# Patient Record
Sex: MALE | Race: WHITE | NOT HISPANIC OR LATINO | URBAN - METROPOLITAN AREA
[De-identification: names, ages, dates, MRNs, and addresses within clinical notes are randomized per-mention and may not be internally consistent; named-entity substitution may affect disease eponyms.]

---

## 2024-01-04 ENCOUNTER — TELEPHONE (OUTPATIENT)
Dept: NEUROSURGERY | Facility: CLINIC | Age: 54
End: 2024-01-04
Payer: OTHER GOVERNMENT

## 2024-01-04 NOTE — TELEPHONE ENCOUNTER
----- Message from Jenifer Stark sent at 1/4/2024  1:52 PM CST -----  Contact: PT  1/4/24    PT is requesting a callback in regards to scheduling, referral in WQ- pt advised that he has his images from an outsider provider on disc.      Pt phone    [phone] (home)       Thanks    Jenifer PINA

## 2024-02-06 ENCOUNTER — OFFICE VISIT (OUTPATIENT)
Dept: NEUROSURGERY | Facility: CLINIC | Age: 54
End: 2024-02-06
Payer: OTHER GOVERNMENT

## 2024-02-06 VITALS — SYSTOLIC BLOOD PRESSURE: 114 MMHG | WEIGHT: 297.63 LBS | DIASTOLIC BLOOD PRESSURE: 79 MMHG | TEMPERATURE: 98 F

## 2024-02-06 DIAGNOSIS — Z98.1 S/P SPINAL FUSION: ICD-10-CM

## 2024-02-06 DIAGNOSIS — R20.0 NUMBNESS OF LEGS: ICD-10-CM

## 2024-02-06 DIAGNOSIS — M54.16 LUMBAR RADICULOPATHY: ICD-10-CM

## 2024-02-06 DIAGNOSIS — M54.9 DORSALGIA, UNSPECIFIED: Primary | ICD-10-CM

## 2024-02-06 PROCEDURE — 99214 OFFICE O/P EST MOD 30 MIN: CPT | Mod: PBBFAC | Performed by: NURSE PRACTITIONER

## 2024-02-06 PROCEDURE — 99204 OFFICE O/P NEW MOD 45 MIN: CPT | Mod: S$PBB,,, | Performed by: NURSE PRACTITIONER

## 2024-02-06 PROCEDURE — 99999 PR PBB SHADOW E&M-EST. PATIENT-LVL IV: CPT | Mod: PBBFAC,,, | Performed by: NURSE PRACTITIONER

## 2024-02-06 RX ORDER — LEVOTHYROXINE SODIUM 100 UG/1
100 TABLET ORAL
COMMUNITY
Start: 2023-08-04

## 2024-02-06 RX ORDER — DICLOFENAC SODIUM 10 MG/G
GEL TOPICAL
COMMUNITY
Start: 2024-01-19

## 2024-02-06 RX ORDER — POLYETHYLENE GLYCOL 3350 17 G/17G
17 POWDER, FOR SOLUTION ORAL
COMMUNITY
Start: 2024-01-22 | End: 2024-03-27 | Stop reason: CLARIF

## 2024-02-06 RX ORDER — ATORVASTATIN CALCIUM 10 MG/1
TABLET, FILM COATED ORAL
COMMUNITY
Start: 2023-08-04 | End: 2024-08-04

## 2024-02-06 RX ORDER — CHOLECALCIFEROL (VITAMIN D3) 50 MCG
50 TABLET ORAL
COMMUNITY
Start: 2023-07-27 | End: 2024-03-27 | Stop reason: CLARIF

## 2024-02-06 RX ORDER — BUPRENORPHINE HYDROCHLORIDE 300 UG/1
300 FILM, SOLUBLE BUCCAL
COMMUNITY
Start: 2023-06-05 | End: 2024-03-27 | Stop reason: CLARIF

## 2024-02-06 RX ORDER — DIAZEPAM 5 MG/1
5 TABLET ORAL
COMMUNITY
Start: 2023-09-27 | End: 2024-03-27 | Stop reason: CLARIF

## 2024-02-06 RX ORDER — METHOCARBAMOL 500 MG/1
TABLET, FILM COATED ORAL
COMMUNITY
Start: 2024-01-20 | End: 2024-02-19

## 2024-02-06 RX ORDER — METHYLPHENIDATE HYDROCHLORIDE 20 MG/1
20 TABLET ORAL 2 TIMES DAILY
COMMUNITY

## 2024-02-06 RX ORDER — NALOXONE HYDROCHLORIDE 4 MG/.1ML
SPRAY NASAL
COMMUNITY
Start: 2023-10-19

## 2024-02-06 RX ORDER — BUPRENORPHINE 2 MG/1
TABLET SUBLINGUAL
COMMUNITY
Start: 2023-09-26 | End: 2024-02-21

## 2024-02-06 RX ORDER — CHOLECALCIFEROL (VITAMIN D3) 50 MCG
TABLET ORAL
COMMUNITY
Start: 2024-01-19 | End: 2024-07-27

## 2024-02-06 RX ORDER — MELOXICAM 7.5 MG/1
TABLET ORAL
COMMUNITY
Start: 2024-01-22 | End: 2024-04-02

## 2024-02-06 NOTE — PROGRESS NOTES
Neurosurgery  History & Physical    SUBJECTIVE:     History of Present Illness: Dexter Ward is a 53 y.o. male being seen in clinic today for a second opinion regarding worsening lumbar radiculopathy and broken hardware. The patient has a hx of L4-S1 PSF in 2010/2011. States that the surgery alleviated some of his back pain and allowed him to stand up straighter. In 2014 it was noted that the pedicle screw on the right at S1 was broken. Lucency also noted to the bilateral screws at L4/5. Around 2016 he sustained a fall that has since led to a slow progression of back and leg pain R>L despite conservative treatment. His pain management provider has informed him that a SCS trial might be his last option. Describes the pain as constant and aching across the low back with radiation into the right buttock wrapping into the hip and extending down the thigh. Denies left leg pain. Rates the pain as a 5/10. Aggravating factors include standing and walking. Alleviating factors include sitting. Denies weakness, b/b dysfunction, saddle anesthesia, or gait instability.      Review of patient's allergies indicates:   Allergen Reactions    Opioids - morphine analogues        Current Outpatient Medications   Medication Sig Dispense Refill    atorvastatin (LIPITOR) 10 MG tablet TAKE ONE-HALF TABLET BY MOUTH EVERY DAY FOR CHOLESTEROL      buprenorphine HCL (BELBUCA) 300 mcg Film 300 mcg.      buprenorphine HCL (SUBUTEX) 2 mg Subl DISSOLVE ONE TABLET UNDER THE TONGUE TWICE A DAY FOR CHRONIC PAIN      cholecalciferol, vitamin D3, (VITAMIN D3) 50 mcg (2,000 unit) Tab 50 mcg.      cholecalciferol, vitamin D3, (VITAMIN D3) 50 mcg (2,000 unit) Tab TAKE ONE TABLET BY MOUTH DAILY AS A VITAMIN D3 SUPPLEMENT      diazePAM (VALIUM) 5 MG tablet Take 5 mg by mouth.      diclofenac sodium (VOLTAREN) 1 % Gel Apply topically.      levothyroxine (SYNTHROID) 100 MCG tablet 100 mcg.      meloxicam (MOBIC) 7.5 MG tablet TAKE ONE TABLET BY MOUTH  DAILY FOR PAIN AND INFLAMMATION      methocarbamoL (ROBAXIN) 500 MG Tab TAKE TWO TABLETS BY MOUTH FOUR TIMES A DAY AS NEEDED      methyl salicylate-menthol 15-10% 15-10 % Crea APPLY MODERATE AMOUNT TOPICALLY THREE TIMES A DAY FOR PAIN RELIEF      naloxone (NARCAN) 4 mg/actuation Spry SMARTSIG:Both Nares      polyethylene glycol (GLYCOLAX) 17 gram/dose powder Take 17 g by mouth.      semaglutide (OZEMPIC) 2 mg/dose (8 mg/3 mL) PnIj INJECT 2MG SUBCUTANEOUSLY (UNDER THE SKIN) WEEKLY FOR DIABETES * NOTICE THESE ARE 2MG/0.75ML PENS BACK IN STOCK      methylphenidate HCl (RITALIN) 20 MG tablet Take 20 mg by mouth 2 (two) times daily.       No current facility-administered medications for this visit.       No past medical history on file.  No past surgical history on file.  Family History    None       Social History     Socioeconomic History    Marital status: Unknown   Tobacco Use    Smoking status: Never    Smokeless tobacco: Never       Review of Systems    OBJECTIVE:     Vital Signs  Temp: 98.1 °F (36.7 °C)  Pulse: (P) 74  BP: 114/79  Pain Score:   5  Weight: 135 kg (297 lb 9.9 oz)  There is no height or weight on file to calculate BMI.      Neurosurgery Physical Exam  General: well developed, well nourished, no distress.   Head: normocephalic, atraumatic  Neurologic: Alert and oriented. Thought content appropriate.  GCS: Motor: 6/Verbal: 5/Eyes: 4 GCS Total: 15  Mental Status: Awake, Alert, Oriented x 4  Language: No aphasia  Speech: No dysarthria  Cranial nerves: face symmetric, tongue midline, CN II-XII grossly intact.   Eyes: pupils equal, round, reactive to light with accomodation, EOMI.   Pulmonary: normal respirations, no signs of respiratory distress  Abdomen: soft, non-distended  Skin: Skin is warm, dry and intact.  Sensory: intact to light touch throughout  Motor Strength:Moves all extremities spontaneously with good tone.   Strength  Deltoids Triceps Biceps Wrist Extension Wrist Flexion Hand    Upper: R  5/5 5/5 5/5 5/5 5/5 5/5    L 5/5 5/5 5/5 5/5 5/5 5/5     HF KE KF DF PF EHL   Lower: R 4+/5-pain limited 4+/5-pain limited 5/5 5/5 5/5 5/5    L 5/5 5/5 5/5 5/5 5/5 5/5     Reflexes:   Farrell's: Negative.     Cerebellar:   Gait antalgic   Tandem Gait: Slight loss of balance  Able to walk on heels & toes     Cervical:   ROM: Full with flexion, extension, lateral rotation and ear-to-shoulder bend.   Midline TTP: Negative.     Thoracic:  Midline TTP: Negative.     Lumbar:  Midline TTP:Positive  Straight Leg Test: Negative.    Other:  SI joint TTP: Positive RIGHT.  Greater trochanter TTP: Negative.  Tenderness with external/internal hip rotation: Negative.    Diagnostic Results:  I have personally reviewed the imaging:    CT lumbar spine dated 1/10/23 shows PSF L4-S1 with lucency surrounding the bilateral screws. Bony fusion at L5-S1. Multilevel degenerative changes with mild to moderate neural foraminal narrowing. Fracture of the Right S1 screw.    2.  MRI lumbar spine dated 1/10/23, which shows multilevel degenerative changes. Grade 1 anterolisthesis L5-S1.     ASSESSMENT/PLAN:   Dexter Ward is a 53 y.o. male seen in clinic today for a second opinion regarding worsening lumbar radiculopathy despite conservative treatment and to evaluate broken hardware. The patient has a hx of L4-S1 PSF in 2010/2011. I have ordered:    -Updated MRI lumbar spine to evaluate for worsening stenosis given his radicular complaints  - Dynamic x-rays for instability  - Updated CT lumbar spine to better assess the fusion/ progression of lucency  - EMG BLE to evaluate the chronicity of his paraesthesias    I would like the patient to follow-up in clinic with Dr. Starkey to discuss the findings and if he would be a candidate for surgery vs. Proceed with SCS trial. I have encouraged him to contact the clinic with any questions, concerns, or adverse clinical changes. He verbalized understanding.      Elyssa Ortega,  FNP-C  Neurosurgery  [facility].      Note dictated with voice recognition software, please excuse any grammatical errors.

## 2024-02-09 ENCOUNTER — TELEPHONE (OUTPATIENT)
Dept: NEUROSURGERY | Facility: CLINIC | Age: 54
End: 2024-02-09
Payer: OTHER GOVERNMENT

## 2024-02-09 NOTE — TELEPHONE ENCOUNTER
----- Message from Shahrzad Rdz MA sent at 2/7/2024 11:37 AM CST -----  Regarding: appointment  Hey,    Can you please assist me with getting this pt scheduled for an appointment?    Thanks

## 2024-02-26 ENCOUNTER — TELEPHONE (OUTPATIENT)
Dept: NEUROSURGERY | Facility: CLINIC | Age: 54
End: 2024-02-26
Payer: OTHER GOVERNMENT

## 2024-03-01 ENCOUNTER — HOSPITAL ENCOUNTER (OUTPATIENT)
Dept: RADIOLOGY | Facility: HOSPITAL | Age: 54
Discharge: HOME | End: 2024-03-01
Attending: NURSE PRACTITIONER
Payer: OTHER GOVERNMENT

## 2024-03-01 DIAGNOSIS — Z98.1 S/P SPINAL FUSION: ICD-10-CM

## 2024-03-01 DIAGNOSIS — M54.16 LUMBAR RADICULOPATHY: ICD-10-CM

## 2024-03-01 DIAGNOSIS — M54.9 DORSALGIA, UNSPECIFIED: ICD-10-CM

## 2024-03-01 PROCEDURE — 72082 X-RAY EXAM ENTIRE SPI 2/3 VW: CPT | Mod: TC

## 2024-03-01 PROCEDURE — 72082 X-RAY EXAM ENTIRE SPI 2/3 VW: CPT | Mod: 26,,, | Performed by: RADIOLOGY

## 2024-03-01 PROCEDURE — 72114 X-RAY EXAM L-S SPINE BENDING: CPT | Mod: 26,,, | Performed by: RADIOLOGY

## 2024-03-01 PROCEDURE — 72131 CT LUMBAR SPINE W/O DYE: CPT | Mod: TC,PO

## 2024-03-01 PROCEDURE — 72114 X-RAY EXAM L-S SPINE BENDING: CPT | Mod: TC

## 2024-03-01 PROCEDURE — 72148 MRI LUMBAR SPINE W/O DYE: CPT | Mod: TC,PO

## 2024-03-13 ENCOUNTER — OFFICE VISIT (OUTPATIENT)
Dept: PHYSICAL MEDICINE AND REHAB | Facility: CLINIC | Age: 54
End: 2024-03-13
Payer: OTHER GOVERNMENT

## 2024-03-13 DIAGNOSIS — M79.609 PARESTHESIA AND PAIN OF EXTREMITY: Primary | ICD-10-CM

## 2024-03-13 DIAGNOSIS — Z98.1 S/P SPINAL FUSION: ICD-10-CM

## 2024-03-13 DIAGNOSIS — R20.2 PARESTHESIA AND PAIN OF EXTREMITY: Primary | ICD-10-CM

## 2024-03-13 DIAGNOSIS — M54.16 LUMBAR RADICULOPATHY: ICD-10-CM

## 2024-03-13 PROCEDURE — 95886 MUSC TEST DONE W/N TEST COMP: CPT | Mod: PBBFAC,PN | Performed by: STUDENT IN AN ORGANIZED HEALTH CARE EDUCATION/TRAINING PROGRAM

## 2024-03-13 PROCEDURE — 95910 NRV CNDJ TEST 7-8 STUDIES: CPT | Mod: 26,S$PBB,, | Performed by: STUDENT IN AN ORGANIZED HEALTH CARE EDUCATION/TRAINING PROGRAM

## 2024-03-13 PROCEDURE — 95910 NRV CNDJ TEST 7-8 STUDIES: CPT | Mod: PBBFAC,PN | Performed by: STUDENT IN AN ORGANIZED HEALTH CARE EDUCATION/TRAINING PROGRAM

## 2024-03-13 PROCEDURE — 99499 UNLISTED E&M SERVICE: CPT | Mod: S$PBB,,, | Performed by: STUDENT IN AN ORGANIZED HEALTH CARE EDUCATION/TRAINING PROGRAM

## 2024-03-13 PROCEDURE — 95886 MUSC TEST DONE W/N TEST COMP: CPT | Mod: 26,S$PBB,, | Performed by: STUDENT IN AN ORGANIZED HEALTH CARE EDUCATION/TRAINING PROGRAM

## 2024-03-13 PROCEDURE — 99212 OFFICE O/P EST SF 10 MIN: CPT | Mod: PBBFAC,PN | Performed by: STUDENT IN AN ORGANIZED HEALTH CARE EDUCATION/TRAINING PROGRAM

## 2024-03-13 PROCEDURE — 99999 PR PBB SHADOW E&M-EST. PATIENT-LVL II: CPT | Mod: PBBFAC,,, | Performed by: STUDENT IN AN ORGANIZED HEALTH CARE EDUCATION/TRAINING PROGRAM

## 2024-03-13 NOTE — PROGRESS NOTES
[facility]  Physical Medicine and Rehabilitation   [address] [place] [address], [address]  [place] [address]       Patient: Dexter Ward   Patient ID: [id number]   Sex: Male   YOB: 1970   Age: 53 Years 4 Months   Notes:  Symptoms suggestive of lumbar radiculopathy into both lower extremities (right worse than left). Hx of lumbar spine fusion approx 12 years ago. Symptoms have been radiating into the legs for the past 3-4 years and havent changed significantly in the past several months.    Last visit date: 3/13/2024         Visit date and time: 3/13/2024 10:03   Patient Age on Visit Date: 53 Years 4 Months   Referring Physician: Elyssa Ortega NP   Diagnoses:  Paresthesia of the skin         Sensory NCS      Nerve / Sites Rec. Site Onset Lat Peak Lat Ref. NP Amp Ref. PP Amp Ref. Segments Distance Velocity     ms ms ms µV µV µV µV  cm m/s   L Sural - Ankle (Calf)      Calf Ankle 3.23 4.01 ?4.20 10.5 ?5.0  ?5.0 Calf - Ankle 14 43   R Sural - Ankle (Calf)      Calf Ankle 3.54 4.17 ?4.20 5.0 ?5.0 7.5 ?5.0 Calf - Ankle 14 40   L Superficial peroneal - Ankle      Lat leg Ankle 3.44 4.32 ?4.40 8.9 ?5.0  ?5.0 Lat leg - Ankle 14 41   R Superficial peroneal - Ankle      Lat leg Ankle 3.39 4.27 ?4.40 17.4 ?5.0  ?5.0 Lat leg - Ankle 14 41       Motor NCS      Nerve / Sites Muscle Latency Ref. Amplitude Ref. Amp % Duration Segments Distance Lat Diff Velocity Ref.     ms ms mV mV % ms  cm ms m/s m/s   L Tibial - AH      Ankle AH 4.79 ?6.00 7.0 ?3.0 100 6.93 Ankle - AH 8         Pop fossa AH 14.11  7.5  107 9.38 Pop fossa - Ankle 37 9.32 40 ?39   R Tibial - AH      Ankle AH 4.53 ?6.00 9.3 ?3.0 100 5.83 Ankle - AH 8         Pop fossa AH 14.06  8.1  87.5  Pop fossa - Ankle 39 9.53 41 ?39   L Peroneal - Tib Ant      Fib Head Tib Ant 3.70  3.5  100 6.87 Fib Head - Tib Ant 10         Pop fossa Tib Ant 4.79  3.5  99.5 4.90 Pop fossa - Fib Head 10 1.09 91    R Peroneal - Tib Ant      Fib Head Tib Ant 3.33  2.8   100 4.58 Fib Head - Tib Ant 10         Pop fossa Tib Ant 4.84  2.4  87.3 3.75 Pop fossa - Fib Head 10 1.51 66        EMG Summary Table     Spontaneous MUAP Recruitment   Muscle Nerve Roots IA Fib PSW Fasc H.F. Amp Dur. PPP Pattern   R. Tibialis anterior Deep peroneal (Fibular) L4-L5 N None None None None N N N N   L. Vastus medialis Femoral L2-L4 N None None None None N N N N   R. Vastus medialis Femoral L2-L4 N None None None None N N N N   L. Tibialis anterior Deep peroneal (Fibular) L4-L5 N None None None None N N N N   L. Tibialis posterior Tibial L4-L5 N None None None None N N N N   R. Tibialis posterior Tibial L4-L5 N None None None None N N N N   L. Peroneus longus Perineal L5-S1 N None None None None N N N N   R. Peroneus longus Perineal L5-S1 N None None None None N N N N   L. Gastrocnemius (Medial head) Tibial S1-S2 N None None None None N N N N   R. Gastrocnemius (Medial head) Tibial S1-S2 N None None None None N N N N       Summary    The motor conduction test was performed on 5 nerve(s). The results were normal in 2 nerve(s): L Tibial - AH, R Tibial - AH. Findings were unremarkable in 3 nerve(s): R Peroneal - EDB, L Peroneal - Tib Ant, R Peroneal - Tib Ant. There were no results outside the specified normal range.    The sensory conduction test was normal in all 4 of the tested nerves: L Sural - Ankle (Calf), R Sural - Ankle (Calf), L Superficial peroneal - Ankle, R Superficial peroneal - Ankle.    The needle EMG study was normal in all 10 tested muscles: R. Tibialis anterior, L. Vastus medialis, R. Vastus medialis, L. Tibialis anterior, L. Tibialis posterior, R. Tibialis posterior, L. Peroneus longus, R. Peroneus longus, L. Gastrocnemius (Medial head), R. Gastrocnemius (Medial head).     Conclusion:     Normal study    EMG and nerve conduction study of both lower extremities was normal.  There was no electrodiagnostic evidence of lumbosacral radiculopathy, lumbosacral plexopathy, large fiber  peripheral polyneuropathy or myopathy in the lower extremities.     Plan: Discussed results with patient. F/u with referring provider for additional management.     ____________________________  Thiago Argueta MD

## 2024-03-25 ENCOUNTER — TELEPHONE (OUTPATIENT)
Dept: NEUROSURGERY | Facility: CLINIC | Age: 54
End: 2024-03-25

## 2024-03-25 ENCOUNTER — OFFICE VISIT (OUTPATIENT)
Dept: NEUROSURGERY | Facility: CLINIC | Age: 54
End: 2024-03-25
Payer: OTHER GOVERNMENT

## 2024-03-25 VITALS — HEART RATE: 86 BPM | SYSTOLIC BLOOD PRESSURE: 131 MMHG | DIASTOLIC BLOOD PRESSURE: 90 MMHG

## 2024-03-25 DIAGNOSIS — M96.0 PSEUDARTHROSIS AFTER FUSION OR ARTHRODESIS: ICD-10-CM

## 2024-03-25 DIAGNOSIS — Z98.1 S/P SPINAL FUSION: Primary | ICD-10-CM

## 2024-03-25 PROCEDURE — 99214 OFFICE O/P EST MOD 30 MIN: CPT | Mod: S$PBB,,, | Performed by: STUDENT IN AN ORGANIZED HEALTH CARE EDUCATION/TRAINING PROGRAM

## 2024-03-25 PROCEDURE — 99213 OFFICE O/P EST LOW 20 MIN: CPT | Mod: PBBFAC | Performed by: STUDENT IN AN ORGANIZED HEALTH CARE EDUCATION/TRAINING PROGRAM

## 2024-03-25 PROCEDURE — 99999 PR PBB SHADOW E&M-EST. PATIENT-LVL III: CPT | Mod: PBBFAC,,, | Performed by: STUDENT IN AN ORGANIZED HEALTH CARE EDUCATION/TRAINING PROGRAM

## 2024-03-25 RX ORDER — HYDROCODONE BITARTRATE AND ACETAMINOPHEN 7.5; 325 MG/1; MG/1
1 TABLET ORAL EVERY 6 HOURS PRN
Status: ON HOLD | COMMUNITY
Start: 2024-03-22 | End: 2024-04-08 | Stop reason: HOSPADM

## 2024-03-25 RX ORDER — LIDOCAINE 50 MG/G
2 PATCH TOPICAL
COMMUNITY
Start: 2024-01-19

## 2024-03-25 NOTE — H&P (VIEW-ONLY)
Neurosurgery  Established Patient    SUBJECTIVE:     History of Present Illness:  Dexter Ward is a 53 y.o. male being seen in clinic today for a second opinion regarding worsening lumbar radiculopathy and broken hardware. The patient has a hx of L4-S1 PSF in 2010/2011. States that the surgery alleviated some of his back pain and allowed him to stand up straighter. In 2014 it was noted that the pedicle screw on the right at S1 was broken. Lucency also noted to the bilateral screws at L4/5. Around 2016 he sustained a fall that has since led to a slow progression of back and leg pain R>L despite conservative treatment. His pain management provider has informed him that a SCS trial might be his last option. Describes the pain as constant and aching across the low back with radiation into the right buttock wrapping into the hip and extending down the thigh. Denies left leg pain. Rates the pain as a 5/10. Aggravating factors include standing and walking. Alleviating factors include sitting. Denies weakness, b/b dysfunction, saddle anesthesia, or gait instability.      Interval fu 3/25/24:  Pt continues to endorse primarily back pain which is made worse with any activity.  He has occasional bilateral LE radicular pain down thighs stopping at knees.  He is a nonsmoker.    Review of patient's allergies indicates:   Allergen Reactions    Opioids - morphine analogues        Current Outpatient Medications   Medication Sig Dispense Refill    atorvastatin (LIPITOR) 10 MG tablet TAKE ONE-HALF TABLET BY MOUTH EVERY DAY FOR CHOLESTEROL      cholecalciferol, vitamin D3, (VITAMIN D3) 50 mcg (2,000 unit) Tab 50 mcg.      diclofenac sodium (VOLTAREN) 1 % Gel Apply topically.      HYDROcodone-acetaminophen (NORCO) 7.5-325 mg per tablet Take 1 tablet by mouth every 6 (six) hours as needed.      levothyroxine (SYNTHROID) 100 MCG tablet 100 mcg.      LIDOcaine (LIDODERM) 5 % 2 patches.      methylphenidate HCl (RITALIN) 20 MG tablet  Take 20 mg by mouth 2 (two) times daily.      naloxone (NARCAN) 4 mg/actuation Spry SMARTSIG:Both Nares      semaglutide (OZEMPIC) 2 mg/dose (8 mg/3 mL) PnIj INJECT 2MG SUBCUTANEOUSLY (UNDER THE SKIN) WEEKLY FOR DIABETES * NOTICE THESE ARE 2MG/0.75ML PENS BACK IN STOCK      buprenorphine HCL (BELBUCA) 300 mcg Film 300 mcg.      cholecalciferol, vitamin D3, (VITAMIN D3) 50 mcg (2,000 unit) Tab TAKE ONE TABLET BY MOUTH DAILY AS A VITAMIN D3 SUPPLEMENT      diazePAM (VALIUM) 5 MG tablet Take 5 mg by mouth.      meloxicam (MOBIC) 7.5 MG tablet TAKE ONE TABLET BY MOUTH DAILY FOR PAIN AND INFLAMMATION      polyethylene glycol (GLYCOLAX) 17 gram/dose powder Take 17 g by mouth.       No current facility-administered medications for this visit.       No past medical history on file.  No past surgical history on file.  Family History    None       Social History     Socioeconomic History    Marital status: Unknown   Tobacco Use    Smoking status: Never    Smokeless tobacco: Never       Review of Systems  14 point ROS was negative    OBJECTIVE:     Vital Signs  Pulse: 86  BP: (!) 131/90  Pain Score:   7  There is no height or weight on file to calculate BMI.    Neurosurgery Physical Exam  General: well developed, well nourished, no distress.   Head: normocephalic, atraumatic  Neurologic: Alert and oriented. Thought content appropriate.  GCS: Motor: 6/Verbal: 5/Eyes: 4 GCS Total: 15  Mental Status: Awake, Alert, Oriented x 4  Language: No aphasia  Speech: No dysarthria  Cranial nerves: face symmetric, tongue midline, CN II-XII grossly intact.   Eyes: pupils equal, round, reactive to light with accomodation, EOMI.   Pulmonary: normal respirations, no signs of respiratory distress  Abdomen: soft, non-distended  Skin: Skin is warm, dry and intact.  Sensory: intact to light touch throughout  Motor Strength:Moves all extremities spontaneously with good tone.   Strength   Deltoids Triceps Biceps Wrist Extension Wrist Flexion Hand     Upper: R 5/5 5/5 5/5 5/5 5/5 5/5     L 5/5 5/5 5/5 5/5 5/5 5/5       HF KE KF DF PF EHL   Lower: R 4+/5-pain limited 4+/5-pain limited 5/5 5/5 5/5 5/5     L 5/5 5/5 5/5 5/5 5/5 5/5      Reflexes:   Farrell's: Negative.     Cerebellar:   Gait antalgic   Tandem Gait: Slight loss of balance  Able to walk on heels & toes     Cervical:   ROM: Full with flexion, extension, lateral rotation and ear-to-shoulder bend.   Midline TTP: Negative.     Thoracic:  Midline TTP: Negative.     Lumbar:  Midline TTP:Positive  Straight Leg Test: Negative.     Other:  SI joint TTP: Positive RIGHT.  Greater trochanter TTP: Negative.  Tenderness with external/internal hip rotation: Negative.    Diagnostic Results:  Flex/ex L: s/p L4-S1 fusion.  Haloing of bilateral L4 pedicle screws.  No instability.  Fractured right S1 pedicle screw.  CT L spine: s/p L4-S1 fusion.  Haloing of bilateral L4 screws.  Solid interbody and posterolateral fusion at L5/S1.  MRI L spine: no significant central or foraminal stenosis.  Scoli: incomplete, no coronal deformity present.  Reviewed    ASSESSMENT/PLAN:     53 M s/p L4-S1 fusion in 2010 presents for worsening of low back pain and intermittent BLE radicular pain.  His imaging is described above and shows hardware failure at L4.  Will plan for L3-S1 revision/fusion in April of 2024.

## 2024-03-26 ENCOUNTER — TELEPHONE (OUTPATIENT)
Dept: NEUROSURGERY | Facility: CLINIC | Age: 54
End: 2024-03-26
Payer: OTHER GOVERNMENT

## 2024-03-26 DIAGNOSIS — M96.0 PSEUDARTHROSIS AFTER FUSION OR ARTHRODESIS: Primary | ICD-10-CM

## 2024-03-27 ENCOUNTER — TELEPHONE (OUTPATIENT)
Dept: PREADMISSION TESTING | Facility: HOSPITAL | Age: 54
End: 2024-03-27
Payer: OTHER GOVERNMENT

## 2024-03-27 DIAGNOSIS — Z01.818 PREOPERATIVE TESTING: Primary | ICD-10-CM

## 2024-03-27 NOTE — ANESTHESIA PAT ROS NOTE
03/27/2024  Dexter Ward is a 53 y.o., male.      Pre-op Assessment          Review of Systems           Anesthesia Assessment: Preoperative EQUATION    Planned Procedure: Procedure(s) (LRB):  ROBOTIC REVISION L3-S1 FUSION (N/A)  Requested Anesthesia Type:General  Surgeon: Gilson Starkey DO  Service: Neurosurgery  Known or anticipated Date of Surgery:4/5/2024    Surgeon notes: reviewed    Electronic QUestionnaire Assessment completed via nurse interview with patient.        Triage considerations:         Previous anesthesia records:No problems and Not available    Last PCP note: outside [facility]   Subspecialty notes: Neurosurgery, PM&R    Other important co-morbidities: PER EPIC: HLD, Hypothyroid, Morbid Obesity, and PSEUDOARTHROSIS AFTER FUSION, LUMBAR RADICULOPATHY, IBS, MIGRAINES       Tests already available:  Available tests,  within 3 months , within [facility] .   3/1/2024 XRAY SCOLIOSIS COMPLETE, XRAY LUMBAR COMPLETE INCLUDING F&E,  MRI LUMBAR SPINE W/O CONTRAST, CT LUMBAR SPINE W/O CONTRAST          Instructions given. (See in Nurse's note)    Optimization:  Anesthesia Preop Clinic Assessment  Indicated    Medical Opinion Indicated           Plan:    Testing:  BMP, CBC, PT/INR, PTT, TSH, T&S, and UA   Pre-anesthesia  visit       Visit focus: concerns in complex and/or prolonged anesthesia, position other than supine     Consultation:IM Perioperative Hospitalist/ NP     Patient  has previously scheduled Medical Appointment:NONE    Navigation: Tests Scheduled. TBD             Consults scheduled.TBD             Results will be tracked by Preop Clinic.  4/2 Labs resulted and noted by Dr.Stuart Alegre.  4/3 UA resulted and noted by Dr. Freya Baker.  4/4 Medical optimization by Antonio Garza NP  on 4/3.  Betsy Reyes RN BSN

## 2024-03-27 NOTE — PRE-PROCEDURE INSTRUCTIONS
Patient stated has not had any problem with anesthesia in the past. Will need medial [facility]. Not sure if will be able to get an appt. Will need poc appt, labs, and ua. Offered NP appt with other appts for medical optimization. He agrees with this plan.     Preop instructions given. Hold aspirin, aspirin containing products, nsaids(aleve, advil, motrin, ibuprofen, naprosyn, naproxen, voltaren, diclofenac, Mobic, Meloxicam), vitamins ( D)and supplements one week prior to surgery.     May take Tylenol. Hold Ozempic one week prior to surgery.( Also sent to [facility] portal)  Verbalizes understanding.

## 2024-04-01 PROBLEM — G43.909 MIGRAINE: Status: ACTIVE | Noted: 2024-04-01

## 2024-04-01 PROBLEM — E11.9 TYPE 2 DIABETES MELLITUS: Status: ACTIVE | Noted: 2024-04-01

## 2024-04-01 PROBLEM — M96.0 PSEUDARTHROSIS AFTER FUSION OR ARTHRODESIS: Status: ACTIVE | Noted: 2024-04-01

## 2024-04-01 PROBLEM — F90.2 ATTENTION DEFICIT HYPERACTIVITY DISORDER, COMBINED TYPE: Status: ACTIVE | Noted: 2024-04-01

## 2024-04-01 PROBLEM — E78.5 HYPERLIPIDEMIA: Status: ACTIVE | Noted: 2024-04-01

## 2024-04-01 PROBLEM — E66.01 MORBID OBESITY: Status: ACTIVE | Noted: 2024-04-01

## 2024-04-01 PROBLEM — G89.29 CHRONIC PAIN: Status: ACTIVE | Noted: 2024-04-01

## 2024-04-01 PROBLEM — E03.9 HYPOTHYROIDISM: Status: ACTIVE | Noted: 2024-04-01

## 2024-04-01 PROBLEM — F33.9 MAJOR DEPRESSION, RECURRENT: Status: ACTIVE | Noted: 2024-04-01

## 2024-04-01 PROBLEM — K58.0 IRRITABLE BOWEL SYNDROME WITH DIARRHEA: Status: ACTIVE | Noted: 2024-04-01

## 2024-04-01 PROBLEM — R41.3 MEMORY IMPAIRMENT: Status: ACTIVE | Noted: 2024-04-01

## 2024-04-01 NOTE — ASSESSMENT & PLAN NOTE
Per outside problem list  Currently takes:  Ozempic  Most recent A1c is 5.2      Reports peripheral neuropathy.   Denies visual changes. Up to date with eye exams.  Micro changes: Denies- Retinopathy, Nephropathy   Macro changes: Denies-  Carotid, Coronary , Peripheral disease

## 2024-04-01 NOTE — ASSESSMENT & PLAN NOTE
Per outside problem list; alternates with constipation  Not currently treated  Avoid foods that may triggers IBS symptoms.

## 2024-04-01 NOTE — ASSESSMENT & PLAN NOTE
Currently treated with Levothyroxine 100 cmg  TSH:  normal today  Denies Hx of nodules.   Followed by PCP.

## 2024-04-01 NOTE — ASSESSMENT & PLAN NOTE
Treats with Mountain Dew (caffeine works well)  Episodes twice weekly; lasts 1-2 minutes  Not followed by Neuro

## 2024-04-01 NOTE — HPI
This is a 53 y.o. male  who presents today for a preoperative evaluation in preparation for robotic revision L3-S1 fusion.  Surgery is indicated for pseudarthrosis after fusion or arthrodesis .   Patient is new to me.  The history has been obtained by speaking with the patient and reviewing the electronic medical record and/or outside health information. Significant health conditions for the perioperative period are discussed below in assessment and plan.   Patient reports current health status to be Good.  Denies any new symptoms before surgery.

## 2024-04-01 NOTE — DISCHARGE INSTRUCTIONS
Post op Spine Patient Instructions    Activity Restrictions:  [x]  Return to work/school will be determined on an individual basis.  [x]  No lifting greater than 5-10 pounds.  [x]  Avoid bending and twisting the area of your surgery more than 45 degrees from neutral position in any direction.  [x]  No driving or operating machinery:  [x]  until cleared by your surgeon.  [x]  while taking narcotic pain medications or muscle relaxants.  [x]  Wear your brace when out of bed or sitting upright.  [x]  Increase ambulation over the next 2 weeks so that you are walking 2 miles per day at 2 weeks post-operatively.  [x]  Walk on paved surfaces only. It is okay to walk up and down stairs while holding onto a side rail.  [x]  No sexual activity for 6 weeks.    Discharge Medication/Follow-up:  [x]  Please refer to discharge medication reconciliation form.  [x]  Do not take ANY Aspirin or Aspirin containing products for 2 weeks after surgery.   [x]  Do not take ANY herbal supplements for 2 weeks after surgery.    [x]  Do not take ANY non-steroidal anti-inflammatory drugs (NSAIDS), including the following: Ibuprofen, Naproxen, Aleve, Advil, Indocin, Mobic, or Celebrex for 12 weeks after surgery.   [x]  Prescriptions for appropriate medication will be given upon discharge.  [x]  Take docusate (Colace 100 mg): take one capsule a day as needed for constipation. You can get this over the counter.  [x]  Follow-up appointment:  [x]  In 10-14 days post-op for wound check by physician assistant/nurse.  [x]  In 4-6 weeks with MD:  [x]  with x-rays.  [x]  Appointments will be arranged for you and you will be contacted with a date and time.    Wound Care:  [x]  No bandage required. Keep your incision open to the air.   [x]  You may shower after your surgery. Keep the incision clean and dry as much as possible. Do not allow the force of water to hit the incision. If the incision gets damp, pat it dry. Do not rub or scrub the incision.  [x]   You cannot take a bath until 8 weeks after surgery.    Call your doctor or go to the Emergency Room for any signs of infection, including: increased redness, drainage, pain, or fever (temperature ?101.5 for 24 hours). Call your doctor or go to the Emergency Room if there are any localized neurological changes; problems with speech, vision, numbness, tingling, weakness, or severe headache; inability to control urination or bowel movements; inability to urinate; or for other concerns.    Special Instructions:  [x]  No use of tobacco products.  [x]  Diet: Please eat your regular diet as tolerated.      Physicians need 3 days notice for pain medicine to be refilled. Pain medicine will only be refilled between 8 AM and 5 PM, Monday through Friday, due to Food and Drug Administration regulation of documentation.    If you have any questions about this form, please call [phone].    Form No. 39064 (Revised 10/31/2013)

## 2024-04-01 NOTE — OUTPATIENT SUBJECTIVE & OBJECTIVE
Outpatient Subjective & Objective      Chief Complaint: Preoperative evaulation, perioperative medical management, and complication reduction plan.     Functional Capacity: walked to POC without CP/SOB.       Anesthesia issues: None    Difficulty mouth opening: jaw popping and clicking     Steroid use in the last 12 months:  yes to back; pills for left ankle     Dental Issues: None    Family anesthesia difficulty: None     Family Hx of Thrombosis: None    Past Medical History:   Diagnosis Date    Depression     Diabetes mellitus, type 2     Hyperlipidemia     Hypothyroidism     Pulmonary embolism          Past Medical History Pertinent Negatives:   Diagnosis Date Noted    Anemia 04/02/2024    Asthma 04/02/2024    COPD (chronic obstructive pulmonary disease) 04/02/2024    Coronary artery disease 04/02/2024    Deep vein thrombosis 04/02/2024    Disorder of kidney and ureter 04/02/2024    GERD (gastroesophageal reflux disease) 04/02/2024    Hypertension 04/02/2024    Seizures 04/02/2024    Stroke 04/02/2024         Past Surgical History:   Procedure Laterality Date    ANKLE SURGERY Bilateral     x 2- right;; 9 to left    BACK SURGERY      multiple    KNEE ARTHROSCOPY Left        Review of Systems   Constitutional:  Negative for chills, fatigue, fever and unexpected weight change.   HENT:  Positive for postnasal drip and tinnitus. Negative for congestion, hearing loss, rhinorrhea, sore throat and trouble swallowing.    Eyes:  Negative for visual disturbance.   Respiratory:  Negative for cough, chest tightness, shortness of breath and wheezing.    Cardiovascular:  Positive for leg swelling (left ankle). Negative for chest pain and palpitations.   Gastrointestinal:  Positive for anal bleeding (attributes to hemorrhoids- occasional), constipation (alternates with diarrhea) and diarrhea.        Denies Fatty liver, Hepatitis   Genitourinary:  Negative for decreased urine volume, difficulty urinating, dysuria, frequency,  hematuria and urgency.   Musculoskeletal:  Positive for back pain and neck pain. Negative for arthralgias and neck stiffness.   Skin:  Negative for rash and wound.   Neurological:  Positive for numbness (BUE) and headaches. Negative for dizziness, syncope and weakness.   Hematological:  Does not bruise/bleed easily.   Psychiatric/Behavioral:  Negative for sleep disturbance and suicidal ideas.               VITALS  Visit Vitals  /72 (BP Location: Left arm, Patient Position: Sitting)   Pulse 86   Temp 98.7 °F (37.1 °C) (Oral)   Ht 6' (1.829 m)   Wt 133 kg (293 lb 3.4 oz)   SpO2 98%   BMI 39.77 kg/m²          Physical Exam  Vitals reviewed.   Constitutional:       General: He is not in acute distress.     Appearance: He is well-developed. He is obese.   HENT:      Head: Normocephalic.      Nose: Nose normal.      Mouth/Throat:      Pharynx: No oropharyngeal exudate.   Eyes:      General:         Right eye: No discharge.         Left eye: No discharge.      Conjunctiva/sclera: Conjunctivae normal.      Pupils: Pupils are equal, round, and reactive to light.   Neck:      Thyroid: No thyromegaly.      Vascular: No carotid bruit or JVD.      Trachea: No tracheal deviation.   Cardiovascular:      Rate and Rhythm: Normal rate and regular rhythm.      Pulses:           Carotid pulses are 2+ on the right side and 2+ on the left side.       Dorsalis pedis pulses are 2+ on the right side and 2+ on the left side.        Posterior tibial pulses are 2+ on the right side and 2+ on the left side.      Heart sounds: Normal heart sounds. No murmur heard.  Pulmonary:      Effort: Pulmonary effort is normal. No respiratory distress.      Breath sounds: Normal breath sounds. No stridor. No wheezing, rhonchi or rales.   Abdominal:      General: Bowel sounds are decreased. There is no distension.      Palpations: Abdomen is soft.      Tenderness: There is no abdominal tenderness. There is no guarding.   Musculoskeletal:      Cervical  back: Pain with movement (with extension and bilateral movement) present. Decreased range of motion (mild with extension).      Right lower leg: No edema.      Left lower leg: No edema.   Lymphadenopathy:      Cervical: No cervical adenopathy.   Skin:     General: Skin is warm and dry.      Capillary Refill: Capillary refill takes less than 2 seconds.      Findings: No erythema or rash.   Neurological:      Mental Status: He is alert and oriented to person, place, and time.   Psychiatric:         Behavior: Behavior normal.          Significant Labs:  Lab Results   Component Value Date    WBC 9.88 04/02/2024    HGB 15.6 04/02/2024    HCT 44.5 04/02/2024     04/02/2024     04/02/2024    K 4.2 04/02/2024     04/02/2024    CREATININE 0.9 04/02/2024    BUN 9 04/02/2024    CO2 23 04/02/2024    TSH 2.182 04/02/2024    INR 1.0 04/02/2024    HGBA1C 5.2 04/02/2024               Active Cardiac Conditions: None      Revised Cardiac Risk Index   High -Risk Surgery  Intraperitoneal; Intrathoracic; suprainguinal vascular Yes- + 1 No- 0   History of Ischemic Heart Disease   (Hx of MI/positive exercise test/current chest pain due to ischemia/use of nitrate therapy/EKG with pathological Q waves) Yes- + 1 No- 0   History of CHF  (Pulmonary edema/bilateral rales or S3 gallop/PND/CXR showing pulmonary vascular redistribution) Yes- + 1 No- 0   History of CVA   (Prior stroke or TIA) Yes- + 1 No- 0   Pre-operative treatment with insulin Yes- + 1 No- 0   Pre-operative creatinine > 2mg/dl Yes- + 1 No- 0   Total: 0      Risk Status:  Estimated risk of cardiac complications after non-cardiac surgery using the Revised Cardiac Risk Index for Preoperative risk is 3.9 %      ARISCAT (Canet) risk index: Intermediate: 13.3% risk of post-op pulmonary complications.    American Society of Anesthesiologists Physical Status classification (ASA): 3    Filippo respiratory failure index: 1.8 %    OLIVERA postop respiratory failure risk  (For General Anesthesia):         Outpatient Subjective & Objective

## 2024-04-01 NOTE — ASSESSMENT & PLAN NOTE
Recommend weight loss, healthy diet (DASH/Mediterranean) and exercise.   Patient should exercise 30 minutes at least five times weekly once recovered from surgery. Limit alcohol.  Treated with: atorvastatin

## 2024-04-01 NOTE — ASSESSMENT & PLAN NOTE
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

## 2024-04-02 ENCOUNTER — OFFICE VISIT (OUTPATIENT)
Dept: INTERNAL MEDICINE | Facility: CLINIC | Age: 54
DRG: 454 | End: 2024-04-02
Payer: OTHER GOVERNMENT

## 2024-04-02 VITALS
WEIGHT: 293.19 LBS | HEIGHT: 72 IN | BODY MASS INDEX: 39.71 KG/M2 | SYSTOLIC BLOOD PRESSURE: 121 MMHG | HEART RATE: 86 BPM | OXYGEN SATURATION: 98 % | DIASTOLIC BLOOD PRESSURE: 72 MMHG | TEMPERATURE: 99 F

## 2024-04-02 DIAGNOSIS — E11.9 TYPE 2 DIABETES MELLITUS WITHOUT COMPLICATION, UNSPECIFIED WHETHER LONG TERM INSULIN USE: ICD-10-CM

## 2024-04-02 DIAGNOSIS — K58.0 IRRITABLE BOWEL SYNDROME WITH DIARRHEA: ICD-10-CM

## 2024-04-02 DIAGNOSIS — E78.5 HYPERLIPIDEMIA, UNSPECIFIED HYPERLIPIDEMIA TYPE: ICD-10-CM

## 2024-04-02 DIAGNOSIS — G43.909 MIGRAINE WITHOUT STATUS MIGRAINOSUS, NOT INTRACTABLE, UNSPECIFIED MIGRAINE TYPE: ICD-10-CM

## 2024-04-02 DIAGNOSIS — Z01.818 PREOPERATIVE EXAMINATION: Primary | ICD-10-CM

## 2024-04-02 DIAGNOSIS — M96.0 PSEUDARTHROSIS AFTER FUSION OR ARTHRODESIS: ICD-10-CM

## 2024-04-02 DIAGNOSIS — E03.9 HYPOTHYROIDISM, UNSPECIFIED TYPE: ICD-10-CM

## 2024-04-02 DIAGNOSIS — G89.4 CHRONIC PAIN SYNDROME: ICD-10-CM

## 2024-04-02 DIAGNOSIS — E66.01 CLASS 2 SEVERE OBESITY WITH SERIOUS COMORBIDITY AND BODY MASS INDEX (BMI) OF 39.0 TO 39.9 IN ADULT, UNSPECIFIED OBESITY TYPE: ICD-10-CM

## 2024-04-02 DIAGNOSIS — F90.2 ATTENTION DEFICIT HYPERACTIVITY DISORDER, COMBINED TYPE: ICD-10-CM

## 2024-04-02 DIAGNOSIS — F33.9 RECURRENT MAJOR DEPRESSIVE DISORDER, REMISSION STATUS UNSPECIFIED: ICD-10-CM

## 2024-04-02 DIAGNOSIS — R41.3 MEMORY IMPAIRMENT: ICD-10-CM

## 2024-04-02 PROBLEM — E66.812 CLASS 2 SEVERE OBESITY WITH SERIOUS COMORBIDITY AND BODY MASS INDEX (BMI) OF 39.0 TO 39.9 IN ADULT: Status: ACTIVE | Noted: 2024-04-01

## 2024-04-02 PROCEDURE — 99205 OFFICE O/P NEW HI 60 MIN: CPT | Mod: S$PBB,,, | Performed by: NURSE PRACTITIONER

## 2024-04-02 PROCEDURE — 99999 PR PBB SHADOW E&M-EST. PATIENT-LVL IV: CPT | Mod: PBBFAC,,, | Performed by: NURSE PRACTITIONER

## 2024-04-02 PROCEDURE — 99214 OFFICE O/P EST MOD 30 MIN: CPT | Mod: PBBFAC,25 | Performed by: NURSE PRACTITIONER

## 2024-04-02 RX ORDER — IBUPROFEN 800 MG/1
800 TABLET ORAL 3 TIMES DAILY
Status: ON HOLD | COMMUNITY
End: 2024-04-08 | Stop reason: HOSPADM

## 2024-04-02 NOTE — PROGRESS NOTES
[facility]  Progress Note    Patient Name: Dexter Ward  MRN: [id number]  Date of Evaluation- 04/02/2024  PCP- [facility]    Future cases for Dexter Ward [[id number]]       Case ID Status Date Time Jose Enrique Procedure Provider Location    [id number] [facility] 4/5/2024 10:35  ROBOTIC REVISION L3-S1 FUSION Gilson Starkey DO [[id number]] [facility] OR 2ND FLR            HPI:  This is a 53 y.o. male  who presents today for a preoperative evaluation in preparation for robotic revision L3-S1 fusion.  Surgery is indicated for pseudarthrosis after fusion or arthrodesis .   Patient is new to me.  The history has been obtained by speaking with the patient and reviewing the electronic medical record and/or outside health information. Significant health conditions for the perioperative period are discussed below in assessment and plan.   Patient reports current health status to be Good.  Denies any new symptoms before surgery.       Subjective/ Objective:     Chief Complaint: Preoperative evaulation, perioperative medical management, and complication reduction plan.     Functional Capacity: walked to POC without CP/SOB.       Anesthesia issues: None    Difficulty mouth opening: jaw popping and clicking     Steroid use in the last 12 months:  yes to back; pills for left ankle     Dental Issues: None    Family anesthesia difficulty: None     Family Hx of Thrombosis: None    Past Medical History:   Diagnosis Date    Depression     Diabetes mellitus, type 2     Hyperlipidemia     Hypothyroidism     Pulmonary embolism          Past Medical History Pertinent Negatives:   Diagnosis Date Noted    Anemia 04/02/2024    Asthma 04/02/2024    COPD (chronic obstructive pulmonary disease) 04/02/2024    Coronary artery disease 04/02/2024    Deep vein thrombosis 04/02/2024    Disorder of kidney and ureter 04/02/2024    GERD (gastroesophageal reflux disease) 04/02/2024    Hypertension 04/02/2024     Seizures 04/02/2024    Stroke 04/02/2024         Past Surgical History:   Procedure Laterality Date    ANKLE SURGERY Bilateral     x 2- right;; 9 to left    BACK SURGERY      multiple    KNEE ARTHROSCOPY Left        Review of Systems   Constitutional:  Negative for chills, fatigue, fever and unexpected weight change.   HENT:  Positive for postnasal drip and tinnitus. Negative for congestion, hearing loss, rhinorrhea, sore throat and trouble swallowing.    Eyes:  Negative for visual disturbance.   Respiratory:  Negative for cough, chest tightness, shortness of breath and wheezing.    Cardiovascular:  Positive for leg swelling (left ankle). Negative for chest pain and palpitations.   Gastrointestinal:  Positive for anal bleeding (attributes to hemorrhoids- occasional), constipation (alternates with diarrhea) and diarrhea.        Denies Fatty liver, Hepatitis   Genitourinary:  Negative for decreased urine volume, difficulty urinating, dysuria, frequency, hematuria and urgency.   Musculoskeletal:  Positive for back pain and neck pain. Negative for arthralgias and neck stiffness.   Skin:  Negative for rash and wound.   Neurological:  Positive for numbness (BUE) and headaches. Negative for dizziness, syncope and weakness.   Hematological:  Does not bruise/bleed easily.   Psychiatric/Behavioral:  Negative for sleep disturbance and suicidal ideas.               VITALS  Visit Vitals  /72 (BP Location: Left arm, Patient Position: Sitting)   Pulse 86   Temp 98.7 °F (37.1 °C) (Oral)   Ht 6' (1.829 m)   Wt 133 kg (293 lb 3.4 oz)   SpO2 98%   BMI 39.77 kg/m²          Physical Exam  Vitals reviewed.   Constitutional:       General: He is not in acute distress.     Appearance: He is well-developed. He is obese.   HENT:      Head: Normocephalic.      Nose: Nose normal.      Mouth/Throat:      Pharynx: No oropharyngeal exudate.   Eyes:      General:         Right eye: No discharge.         Left eye: No discharge.       Conjunctiva/sclera: Conjunctivae normal.      Pupils: Pupils are equal, round, and reactive to light.   Neck:      Thyroid: No thyromegaly.      Vascular: No carotid bruit or JVD.      Trachea: No tracheal deviation.   Cardiovascular:      Rate and Rhythm: Normal rate and regular rhythm.      Pulses:           Carotid pulses are 2+ on the right side and 2+ on the left side.       Dorsalis pedis pulses are 2+ on the right side and 2+ on the left side.        Posterior tibial pulses are 2+ on the right side and 2+ on the left side.      Heart sounds: Normal heart sounds. No murmur heard.  Pulmonary:      Effort: Pulmonary effort is normal. No respiratory distress.      Breath sounds: Normal breath sounds. No stridor. No wheezing, rhonchi or rales.   Abdominal:      General: Bowel sounds are decreased. There is no distension.      Palpations: Abdomen is soft.      Tenderness: There is no abdominal tenderness. There is no guarding.   Musculoskeletal:      Cervical back: Pain with movement (with extension and bilateral movement) present. Decreased range of motion (mild with extension).      Right lower leg: No edema.      Left lower leg: No edema.   Lymphadenopathy:      Cervical: No cervical adenopathy.   Skin:     General: Skin is warm and dry.      Capillary Refill: Capillary refill takes less than 2 seconds.      Findings: No erythema or rash.   Neurological:      Mental Status: He is alert and oriented to person, place, and time.   Psychiatric:         Behavior: Behavior normal.          Significant Labs:  Lab Results   Component Value Date    WBC 9.88 04/02/2024    HGB 15.6 04/02/2024    HCT 44.5 04/02/2024     04/02/2024     04/02/2024    K 4.2 04/02/2024     04/02/2024    CREATININE 0.9 04/02/2024    BUN 9 04/02/2024    CO2 23 04/02/2024    TSH 2.182 04/02/2024    INR 1.0 04/02/2024    HGBA1C 5.2 04/02/2024               Active Cardiac Conditions: None      Revised Cardiac Risk Index   High  -Risk Surgery  Intraperitoneal; Intrathoracic; suprainguinal vascular Yes- + 1 No- 0   History of Ischemic Heart Disease   (Hx of MI/positive exercise test/current chest pain due to ischemia/use of nitrate therapy/EKG with pathological Q waves) Yes- + 1 No- 0   History of CHF  (Pulmonary edema/bilateral rales or S3 gallop/PND/CXR showing pulmonary vascular redistribution) Yes- + 1 No- 0   History of CVA   (Prior stroke or TIA) Yes- + 1 No- 0   Pre-operative treatment with insulin Yes- + 1 No- 0   Pre-operative creatinine > 2mg/dl Yes- + 1 No- 0   Total: 0      Risk Status:  Estimated risk of cardiac complications after non-cardiac surgery using the Revised Cardiac Risk Index for Preoperative risk is 3.9 %      ARISCAT (Canet) risk index: Intermediate: 13.3% risk of post-op pulmonary complications.    American Society of Anesthesiologists Physical Status classification (ASA): 3    Filippo respiratory failure index: 1.8 %    OLIVERA postop respiratory failure risk (For General Anesthesia):               Orders Placed This Encounter    Hemoglobin A1C           Assessment/Plan:     Pseudarthrosis after fusion or arthrodesis  Scheduled with Dr. Starkey on 4/5/24 for robotic revision L3-S1 fusion.    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  Diagnosed as a child   Managed by: [facility]    Major depression, recurrent  Treating with EMDR therapy  Followed per [facility]  Denies suicidal/homicidal ideations      Hyperlipidemia  Recommend weight loss, healthy  diet (DASH/Mediterranean) and exercise.   Patient should exercise 30 minutes at least five times weekly once recovered from surgery. Limit alcohol.  Treated with: atorvastatin    Type 2 diabetes mellitus  Per outside problem list  Currently takes:  Ozempic  Most recent A1c is 5.2      Reports peripheral neuropathy.   Denies visual changes. Up to date with eye exams.  Micro changes: Denies- Retinopathy, Nephropathy   Macro changes: Denies-  Carotid, Coronary , Peripheral disease       Hypothyroidism  Currently treated with Levothyroxine 100 cmg  TSH:  normal today  Denies Hx of nodules.   Followed by PCP.    Class 2 severe obesity with serious comorbidity and body mass index (BMI) of 39.0 to 39.9 in adult  Lifestyle changes should be made by eating healthy, exercising at least 150 minutes weekly, and avoiding sedentary behavior.       Irritable bowel syndrome with diarrhea  Per outside problem list; alternates with constipation  Not currently treated  Avoid foods that may triggers IBS symptoms.       Discussion/Management of Perioperative Care    Thromboembolic prophylaxis (VTE) Care: Risk factors for thrombosis include: obesity and surgical procedure.  I recommend prophylaxis of thromboembolism with the use of compression stockings/pneumatic devices, and/or pharmacologic agents. The benefits should outweigh the risks for pharmacologic prophylaxis in the perioperative period. I also encourage early ambulation if not contraindicated during the post-operative period.    Risk factors for post-operative pulmonary complications include:diabetes mellitus and surgery > 3 hours. To reduce the risk of pulmonary complications, prophylactic recommendations include: incentive spirometry use/deep breathing, end tidal carbon dioxide monitoring, and pain control.    Risk factors for renal complications include: diabetes mellitus. To reduce the risk of postoperative renal complications, I recommend the patient maintain adequate  fluid volume status by drinking 2 liters of water daily.  Avoid/reduce NSAIDS and DIETZ-2 inhibitors use as well as IV contrast for renal protection.    I recommend the use of appropriate prophylactic antibiotics to reduce the risk of surgical site infections.    Delirium risk factors include opioid use. I recommend to avoid/reduce use of benzodiazepine use (not for patients who take on a regular basis), anticholinergics, Benadryl,  and agents that may cause postoperative serotonin syndrome.  Controlled pain can decrease the risk for postop delirium and since opioids are used for postoperative pain control, I suggest using the lowest dose for the shortest amount of time necessary for pain management.     The patient is at an increased risk for urinary retention due to : spine procedure. I recommend to avoid/decrease the use of benzodiazepines, anticholinergics, and Benadryl in the perioperative period. I also recommend using opioids for the shortest period of time if possible.          This visit was focused on Preoperative evaluation, Perioperative Medical management, complication reduction plans. I suggest that the patient follows up with primary care or relevant sub specialists for ongoing health care.    I appreciate the opportunity to be involved in this patients care. Please feel free to contact me if there were any questions about this consultation.        I spent a total of 63 minutes on the day of the visit.This includes face to face time and non-face to face time preparing to see the patient (e.g., review of tests), obtaining and/or reviewing separately obtained history, documenting clinical information in the electronic or other health record, independently interpreting results and communicating results to the patient/family/caregiver, or care coordinator.       Patient is optimized for surgery.       Antonio Garza NP  Perioperative Medicine  [facility]

## 2024-04-04 ENCOUNTER — TELEPHONE (OUTPATIENT)
Dept: NEUROSURGERY | Facility: CLINIC | Age: 54
End: 2024-04-04
Payer: OTHER GOVERNMENT

## 2024-04-04 NOTE — TELEPHONE ENCOUNTER
Called patient with arrival time for surgery answered all of patient question along with concerns patient verbalized understanding.

## 2024-04-05 ENCOUNTER — ANESTHESIA EVENT (OUTPATIENT)
Dept: SURGERY | Facility: HOSPITAL | Age: 54
DRG: 454 | End: 2024-04-05
Payer: OTHER GOVERNMENT

## 2024-04-05 ENCOUNTER — HOSPITAL ENCOUNTER (INPATIENT)
Facility: HOSPITAL | Age: 54
LOS: 4 days | Discharge: HOME HEALTH | DRG: 454 | End: 2024-04-09
Attending: STUDENT IN AN ORGANIZED HEALTH CARE EDUCATION/TRAINING PROGRAM | Admitting: STUDENT IN AN ORGANIZED HEALTH CARE EDUCATION/TRAINING PROGRAM
Payer: OTHER GOVERNMENT

## 2024-04-05 ENCOUNTER — ANESTHESIA (OUTPATIENT)
Dept: SURGERY | Facility: HOSPITAL | Age: 54
DRG: 454 | End: 2024-04-05
Payer: OTHER GOVERNMENT

## 2024-04-05 DIAGNOSIS — M96.0 PSEUDARTHROSIS AFTER FUSION OR ARTHRODESIS: Primary | ICD-10-CM

## 2024-04-05 DIAGNOSIS — M48.061 LUMBAR CANAL STENOSIS: ICD-10-CM

## 2024-04-05 DIAGNOSIS — R00.0 TACHYCARDIA: ICD-10-CM

## 2024-04-05 LAB
ANION GAP SERPL CALC-SCNC: 10 MMOL/L (ref 8–16)
BASOPHILS # BLD AUTO: 0.04 K/UL (ref 0–0.2)
BASOPHILS NFR BLD: 0.2 % (ref 0–1.9)
BUN SERPL-MCNC: 10 MG/DL (ref 6–20)
CALCIUM SERPL-MCNC: 9.1 MG/DL (ref 8.7–10.5)
CHLORIDE SERPL-SCNC: 104 MMOL/L (ref 95–110)
CO2 SERPL-SCNC: 20 MMOL/L (ref 23–29)
CREAT SERPL-MCNC: 0.9 MG/DL (ref 0.5–1.4)
DIFFERENTIAL METHOD BLD: ABNORMAL
EOSINOPHIL # BLD AUTO: 0 K/UL (ref 0–0.5)
EOSINOPHIL NFR BLD: 0 % (ref 0–8)
ERYTHROCYTE [DISTWIDTH] IN BLOOD BY AUTOMATED COUNT: 13.1 % (ref 11.5–14.5)
EST. GFR  (NO RACE VARIABLE): >60 ML/MIN/1.73 M^2
GLUCOSE SERPL-MCNC: 147 MG/DL (ref 70–110)
HCT VFR BLD AUTO: 41.5 % (ref 40–54)
HGB BLD-MCNC: 14.7 G/DL (ref 14–18)
IMM GRANULOCYTES # BLD AUTO: 0.1 K/UL (ref 0–0.04)
IMM GRANULOCYTES NFR BLD AUTO: 0.5 % (ref 0–0.5)
LYMPHOCYTES # BLD AUTO: 1.2 K/UL (ref 1–4.8)
LYMPHOCYTES NFR BLD: 6.3 % (ref 18–48)
MCH RBC QN AUTO: 33 PG (ref 27–31)
MCHC RBC AUTO-ENTMCNC: 35.4 G/DL (ref 32–36)
MCV RBC AUTO: 93 FL (ref 82–98)
MONOCYTES # BLD AUTO: 1.2 K/UL (ref 0.3–1)
MONOCYTES NFR BLD: 6.3 % (ref 4–15)
NEUTROPHILS # BLD AUTO: 16.9 K/UL (ref 1.8–7.7)
NEUTROPHILS NFR BLD: 86.7 % (ref 38–73)
NRBC BLD-RTO: 0 /100 WBC
PLATELET # BLD AUTO: 279 K/UL (ref 150–450)
PMV BLD AUTO: 9.6 FL (ref 9.2–12.9)
POCT GLUCOSE: 104 MG/DL (ref 70–110)
POCT GLUCOSE: 130 MG/DL (ref 70–110)
POTASSIUM SERPL-SCNC: 3.8 MMOL/L (ref 3.5–5.1)
RBC # BLD AUTO: 4.45 M/UL (ref 4.6–6.2)
SODIUM SERPL-SCNC: 134 MMOL/L (ref 136–145)
WBC # BLD AUTO: 19.54 K/UL (ref 3.9–12.7)

## 2024-04-05 PROCEDURE — 63600175 PHARM REV CODE 636 W HCPCS: Performed by: PHYSICIAN ASSISTANT

## 2024-04-05 PROCEDURE — 82962 GLUCOSE BLOOD TEST: CPT | Performed by: STUDENT IN AN ORGANIZED HEALTH CARE EDUCATION/TRAINING PROGRAM

## 2024-04-05 PROCEDURE — 25000003 PHARM REV CODE 250: Performed by: PHYSICIAN ASSISTANT

## 2024-04-05 PROCEDURE — 36415 COLL VENOUS BLD VENIPUNCTURE: CPT | Performed by: PHYSICIAN ASSISTANT

## 2024-04-05 PROCEDURE — 0QP004Z REMOVAL OF INTERNAL FIXATION DEVICE FROM LUMBAR VERTEBRA, OPEN APPROACH: ICD-10-PCS | Performed by: STUDENT IN AN ORGANIZED HEALTH CARE EDUCATION/TRAINING PROGRAM

## 2024-04-05 PROCEDURE — 63600175 PHARM REV CODE 636 W HCPCS

## 2024-04-05 PROCEDURE — 37000008 HC ANESTHESIA 1ST 15 MINUTES: Performed by: STUDENT IN AN ORGANIZED HEALTH CARE EDUCATION/TRAINING PROGRAM

## 2024-04-05 PROCEDURE — C1713 ANCHOR/SCREW BN/BN,TIS/BN: HCPCS | Performed by: STUDENT IN AN ORGANIZED HEALTH CARE EDUCATION/TRAINING PROGRAM

## 2024-04-05 PROCEDURE — 0SG3071 FUSION OF LUMBOSACRAL JOINT WITH AUTOLOGOUS TISSUE SUBSTITUTE, POSTERIOR APPROACH, POSTERIOR COLUMN, OPEN APPROACH: ICD-10-PCS | Performed by: ORTHOPAEDIC SURGERY

## 2024-04-05 PROCEDURE — 27201423 OPTIME MED/SURG SUP & DEVICES STERILE SUPPLY: Performed by: STUDENT IN AN ORGANIZED HEALTH CARE EDUCATION/TRAINING PROGRAM

## 2024-04-05 PROCEDURE — 25000003 PHARM REV CODE 250: Performed by: NURSE ANESTHETIST, CERTIFIED REGISTERED

## 2024-04-05 PROCEDURE — 80048 BASIC METABOLIC PNL TOTAL CA: CPT | Performed by: PHYSICIAN ASSISTANT

## 2024-04-05 PROCEDURE — 94761 N-INVAS EAR/PLS OXIMETRY MLT: CPT

## 2024-04-05 PROCEDURE — 85025 COMPLETE CBC W/AUTO DIFF WBC: CPT | Performed by: PHYSICIAN ASSISTANT

## 2024-04-05 PROCEDURE — D9220A PRA ANESTHESIA: Mod: ANES,,, | Performed by: ANESTHESIOLOGY

## 2024-04-05 PROCEDURE — 22614 ARTHRD PST TQ 1NTRSPC EA ADD: CPT | Mod: 62,,, | Performed by: ORTHOPAEDIC SURGERY

## 2024-04-05 PROCEDURE — 0SG1071 FUSION OF 2 OR MORE LUMBAR VERTEBRAL JOINTS WITH AUTOLOGOUS TISSUE SUBSTITUTE, POSTERIOR APPROACH, POSTERIOR COLUMN, OPEN APPROACH: ICD-10-PCS | Performed by: STUDENT IN AN ORGANIZED HEALTH CARE EDUCATION/TRAINING PROGRAM

## 2024-04-05 PROCEDURE — 27800903 OPTIME MED/SURG SUP & DEVICES OTHER IMPLANTS: Performed by: STUDENT IN AN ORGANIZED HEALTH CARE EDUCATION/TRAINING PROGRAM

## 2024-04-05 PROCEDURE — 11000001 HC ACUTE MED/SURG PRIVATE ROOM

## 2024-04-05 PROCEDURE — D9220A PRA ANESTHESIA: Mod: CRNA,,, | Performed by: NURSE ANESTHETIST, CERTIFIED REGISTERED

## 2024-04-05 PROCEDURE — 8E0W0CZ ROBOTIC ASSISTED PROCEDURE OF TRUNK REGION, OPEN APPROACH: ICD-10-PCS | Performed by: ORTHOPAEDIC SURGERY

## 2024-04-05 PROCEDURE — 0SB20ZZ EXCISION OF LUMBAR VERTEBRAL DISC, OPEN APPROACH: ICD-10-PCS | Performed by: ORTHOPAEDIC SURGERY

## 2024-04-05 PROCEDURE — 01NB0ZZ RELEASE LUMBAR NERVE, OPEN APPROACH: ICD-10-PCS | Performed by: ORTHOPAEDIC SURGERY

## 2024-04-05 PROCEDURE — 22633 ARTHRD CMBN 1NTRSPC LUMBAR: CPT | Mod: 62,,, | Performed by: STUDENT IN AN ORGANIZED HEALTH CARE EDUCATION/TRAINING PROGRAM

## 2024-04-05 PROCEDURE — 22214 INCIS 1 VERTEBRAL SEG LUMBAR: CPT | Mod: 62,51,, | Performed by: ORTHOPAEDIC SURGERY

## 2024-04-05 PROCEDURE — 22840 INSERT SPINE FIXATION DEVICE: CPT | Mod: 82,,, | Performed by: ORTHOPAEDIC SURGERY

## 2024-04-05 PROCEDURE — 37000009 HC ANESTHESIA EA ADD 15 MINS: Performed by: STUDENT IN AN ORGANIZED HEALTH CARE EDUCATION/TRAINING PROGRAM

## 2024-04-05 PROCEDURE — 36000712 HC OR TIME LEV V 1ST 15 MIN: Performed by: STUDENT IN AN ORGANIZED HEALTH CARE EDUCATION/TRAINING PROGRAM

## 2024-04-05 PROCEDURE — 71000015 HC POSTOP RECOV 1ST HR: Performed by: STUDENT IN AN ORGANIZED HEALTH CARE EDUCATION/TRAINING PROGRAM

## 2024-04-05 PROCEDURE — 0QP104Z REMOVAL OF INTERNAL FIXATION DEVICE FROM SACRUM, OPEN APPROACH: ICD-10-PCS | Performed by: STUDENT IN AN ORGANIZED HEALTH CARE EDUCATION/TRAINING PROGRAM

## 2024-04-05 PROCEDURE — 61783 SCAN PROC SPINAL: CPT | Mod: ,,, | Performed by: STUDENT IN AN ORGANIZED HEALTH CARE EDUCATION/TRAINING PROGRAM

## 2024-04-05 PROCEDURE — 0SG10AJ FUSION OF 2 OR MORE LUMBAR VERTEBRAL JOINTS WITH INTERBODY FUSION DEVICE, POSTERIOR APPROACH, ANTERIOR COLUMN, OPEN APPROACH: ICD-10-PCS | Performed by: STUDENT IN AN ORGANIZED HEALTH CARE EDUCATION/TRAINING PROGRAM

## 2024-04-05 PROCEDURE — 63600175 PHARM REV CODE 636 W HCPCS: Performed by: STUDENT IN AN ORGANIZED HEALTH CARE EDUCATION/TRAINING PROGRAM

## 2024-04-05 PROCEDURE — 22840 INSERT SPINE FIXATION DEVICE: CPT | Mod: ,,, | Performed by: STUDENT IN AN ORGANIZED HEALTH CARE EDUCATION/TRAINING PROGRAM

## 2024-04-05 PROCEDURE — 71000033 HC RECOVERY, INTIAL HOUR: Performed by: STUDENT IN AN ORGANIZED HEALTH CARE EDUCATION/TRAINING PROGRAM

## 2024-04-05 PROCEDURE — 36415 COLL VENOUS BLD VENIPUNCTURE: CPT | Performed by: STUDENT IN AN ORGANIZED HEALTH CARE EDUCATION/TRAINING PROGRAM

## 2024-04-05 PROCEDURE — 63600175 PHARM REV CODE 636 W HCPCS: Performed by: NURSE ANESTHETIST, CERTIFIED REGISTERED

## 2024-04-05 PROCEDURE — 36000713 HC OR TIME LEV V EA ADD 15 MIN: Performed by: STUDENT IN AN ORGANIZED HEALTH CARE EDUCATION/TRAINING PROGRAM

## 2024-04-05 PROCEDURE — 00NY0ZZ RELEASE LUMBAR SPINAL CORD, OPEN APPROACH: ICD-10-PCS | Performed by: STUDENT IN AN ORGANIZED HEALTH CARE EDUCATION/TRAINING PROGRAM

## 2024-04-05 PROCEDURE — 20930 SP BONE ALGRFT MORSEL ADD-ON: CPT | Mod: ,,, | Performed by: STUDENT IN AN ORGANIZED HEALTH CARE EDUCATION/TRAINING PROGRAM

## 2024-04-05 PROCEDURE — 22614 ARTHRD PST TQ 1NTRSPC EA ADD: CPT | Mod: 62,,, | Performed by: STUDENT IN AN ORGANIZED HEALTH CARE EDUCATION/TRAINING PROGRAM

## 2024-04-05 PROCEDURE — 22214 INCIS 1 VERTEBRAL SEG LUMBAR: CPT | Mod: 62,51,, | Performed by: STUDENT IN AN ORGANIZED HEALTH CARE EDUCATION/TRAINING PROGRAM

## 2024-04-05 PROCEDURE — 22633 ARTHRD CMBN 1NTRSPC LUMBAR: CPT | Mod: 62,,, | Performed by: ORTHOPAEDIC SURGERY

## 2024-04-05 PROCEDURE — C1729 CATH, DRAINAGE: HCPCS | Performed by: STUDENT IN AN ORGANIZED HEALTH CARE EDUCATION/TRAINING PROGRAM

## 2024-04-05 PROCEDURE — 71000016 HC POSTOP RECOV ADDL HR: Performed by: STUDENT IN AN ORGANIZED HEALTH CARE EDUCATION/TRAINING PROGRAM

## 2024-04-05 PROCEDURE — 22853 INSJ BIOMECHANICAL DEVICE: CPT | Mod: ,,, | Performed by: STUDENT IN AN ORGANIZED HEALTH CARE EDUCATION/TRAINING PROGRAM

## 2024-04-05 PROCEDURE — 20936 SP BONE AGRFT LOCAL ADD-ON: CPT | Mod: ,,, | Performed by: STUDENT IN AN ORGANIZED HEALTH CARE EDUCATION/TRAINING PROGRAM

## 2024-04-05 PROCEDURE — 22853 INSJ BIOMECHANICAL DEVICE: CPT | Mod: 82,,, | Performed by: ORTHOPAEDIC SURGERY

## 2024-04-05 PROCEDURE — 25000003 PHARM REV CODE 250: Performed by: STUDENT IN AN ORGANIZED HEALTH CARE EDUCATION/TRAINING PROGRAM

## 2024-04-05 PROCEDURE — 0QS00ZZ REPOSITION LUMBAR VERTEBRA, OPEN APPROACH: ICD-10-PCS | Performed by: ORTHOPAEDIC SURGERY

## 2024-04-05 DEVICE — PUTTY OSTEOSELECT DBM SYR 5CC: Type: IMPLANTABLE DEVICE | Site: BACK | Status: FUNCTIONAL

## 2024-04-05 DEVICE — IMPLANTABLE DEVICE: Type: IMPLANTABLE DEVICE | Site: SPINE LUMBAR | Status: FUNCTIONAL

## 2024-04-05 DEVICE — BONE 30CC CANCELLOUS CRUSHED: Type: IMPLANTABLE DEVICE | Site: SPINE LUMBAR | Status: FUNCTIONAL

## 2024-04-05 DEVICE — SCREW BONE SPINAL CREO 6.5X45: Type: IMPLANTABLE DEVICE | Site: SPINE LUMBAR | Status: FUNCTIONAL

## 2024-04-05 DEVICE — CAP SPINAL LOCK THRD CREO 5.5: Type: IMPLANTABLE DEVICE | Site: SPINE LUMBAR | Status: FUNCTIONAL

## 2024-04-05 DEVICE — SCREW BONE SPINAL CREO 6.5X50: Type: IMPLANTABLE DEVICE | Site: SPINE LUMBAR | Status: FUNCTIONAL

## 2024-04-05 RX ORDER — MUPIROCIN 20 MG/G
1 OINTMENT TOPICAL 2 TIMES DAILY
Status: DISCONTINUED | OUTPATIENT
Start: 2024-04-05 | End: 2024-04-05

## 2024-04-05 RX ORDER — OXYCODONE HYDROCHLORIDE 5 MG/1
5 TABLET ORAL EVERY 4 HOURS PRN
Status: DISCONTINUED | OUTPATIENT
Start: 2024-04-05 | End: 2024-04-09 | Stop reason: HOSPADM

## 2024-04-05 RX ORDER — HYDROMORPHONE HYDROCHLORIDE 1 MG/ML
0.2 INJECTION, SOLUTION INTRAMUSCULAR; INTRAVENOUS; SUBCUTANEOUS EVERY 5 MIN PRN
Status: DISCONTINUED | OUTPATIENT
Start: 2024-04-05 | End: 2024-04-05 | Stop reason: HOSPADM

## 2024-04-05 RX ORDER — FENTANYL CITRATE 50 UG/ML
INJECTION, SOLUTION INTRAMUSCULAR; INTRAVENOUS
Status: DISCONTINUED | OUTPATIENT
Start: 2024-04-05 | End: 2024-04-05

## 2024-04-05 RX ORDER — IBUPROFEN 200 MG
24 TABLET ORAL
Status: DISCONTINUED | OUTPATIENT
Start: 2024-04-05 | End: 2024-04-09 | Stop reason: HOSPADM

## 2024-04-05 RX ORDER — HALOPERIDOL 5 MG/ML
0.5 INJECTION INTRAMUSCULAR EVERY 10 MIN PRN
Status: DISCONTINUED | OUTPATIENT
Start: 2024-04-05 | End: 2024-04-05 | Stop reason: HOSPADM

## 2024-04-05 RX ORDER — GLUCAGON 1 MG
1 KIT INJECTION
Status: DISCONTINUED | OUTPATIENT
Start: 2024-04-05 | End: 2024-04-09 | Stop reason: HOSPADM

## 2024-04-05 RX ORDER — AMOXICILLIN 250 MG
2 CAPSULE ORAL DAILY
Status: DISCONTINUED | OUTPATIENT
Start: 2024-04-06 | End: 2024-04-09 | Stop reason: HOSPADM

## 2024-04-05 RX ORDER — LIDOCAINE HYDROCHLORIDE 20 MG/ML
INJECTION, SOLUTION EPIDURAL; INFILTRATION; INTRACAUDAL; PERINEURAL
Status: DISCONTINUED | OUTPATIENT
Start: 2024-04-05 | End: 2024-04-05

## 2024-04-05 RX ORDER — IBUPROFEN 200 MG
16 TABLET ORAL
Status: DISCONTINUED | OUTPATIENT
Start: 2024-04-05 | End: 2024-04-09 | Stop reason: HOSPADM

## 2024-04-05 RX ORDER — MIDAZOLAM HYDROCHLORIDE 1 MG/ML
INJECTION INTRAMUSCULAR; INTRAVENOUS
Status: DISCONTINUED | OUTPATIENT
Start: 2024-04-05 | End: 2024-04-05

## 2024-04-05 RX ORDER — ATORVASTATIN CALCIUM 10 MG/1
10 TABLET, FILM COATED ORAL DAILY
Status: DISCONTINUED | OUTPATIENT
Start: 2024-04-06 | End: 2024-04-09 | Stop reason: HOSPADM

## 2024-04-05 RX ORDER — CEFAZOLIN 2 G/1
INJECTION, POWDER, FOR SOLUTION INTRAMUSCULAR; INTRAVENOUS
Status: DISCONTINUED | OUTPATIENT
Start: 2024-04-05 | End: 2024-04-05

## 2024-04-05 RX ORDER — OXYCODONE HYDROCHLORIDE 10 MG/1
10 TABLET ORAL EVERY 4 HOURS PRN
Status: DISCONTINUED | OUTPATIENT
Start: 2024-04-05 | End: 2024-04-09 | Stop reason: HOSPADM

## 2024-04-05 RX ORDER — FENTANYL CITRATE 50 UG/ML
INJECTION, SOLUTION INTRAMUSCULAR; INTRAVENOUS
Status: COMPLETED
Start: 2024-04-05 | End: 2024-04-05

## 2024-04-05 RX ORDER — HYDROMORPHONE HYDROCHLORIDE 1 MG/ML
1 INJECTION, SOLUTION INTRAMUSCULAR; INTRAVENOUS; SUBCUTANEOUS
Status: DISCONTINUED | OUTPATIENT
Start: 2024-04-05 | End: 2024-04-07

## 2024-04-05 RX ORDER — FENTANYL CITRATE 50 UG/ML
25 INJECTION, SOLUTION INTRAMUSCULAR; INTRAVENOUS EVERY 5 MIN PRN
Status: DISCONTINUED | OUTPATIENT
Start: 2024-04-05 | End: 2024-04-05 | Stop reason: HOSPADM

## 2024-04-05 RX ORDER — SODIUM CHLORIDE 0.9 % (FLUSH) 0.9 %
10 SYRINGE (ML) INJECTION
Status: DISCONTINUED | OUTPATIENT
Start: 2024-04-05 | End: 2024-04-05 | Stop reason: HOSPADM

## 2024-04-05 RX ORDER — INSULIN ASPART 100 [IU]/ML
0-10 INJECTION, SOLUTION INTRAVENOUS; SUBCUTANEOUS
Status: DISCONTINUED | OUTPATIENT
Start: 2024-04-05 | End: 2024-04-09 | Stop reason: HOSPADM

## 2024-04-05 RX ORDER — ACETAMINOPHEN 500 MG
1000 TABLET ORAL EVERY 8 HOURS
Status: DISCONTINUED | OUTPATIENT
Start: 2024-04-05 | End: 2024-04-06

## 2024-04-05 RX ORDER — DIAZEPAM 5 MG/1
5 TABLET ORAL EVERY 6 HOURS PRN
Status: DISCONTINUED | OUTPATIENT
Start: 2024-04-05 | End: 2024-04-08

## 2024-04-05 RX ORDER — ALUMINUM HYDROXIDE, MAGNESIUM HYDROXIDE, AND SIMETHICONE 1200; 120; 1200 MG/30ML; MG/30ML; MG/30ML
30 SUSPENSION ORAL EVERY 4 HOURS PRN
Status: DISCONTINUED | OUTPATIENT
Start: 2024-04-05 | End: 2024-04-09 | Stop reason: HOSPADM

## 2024-04-05 RX ORDER — PROPOFOL 10 MG/ML
VIAL (ML) INTRAVENOUS
Status: DISCONTINUED | OUTPATIENT
Start: 2024-04-05 | End: 2024-04-05

## 2024-04-05 RX ORDER — LEVOTHYROXINE SODIUM 100 UG/1
100 TABLET ORAL
Status: DISCONTINUED | OUTPATIENT
Start: 2024-04-06 | End: 2024-04-09 | Stop reason: HOSPADM

## 2024-04-05 RX ORDER — METHOCARBAMOL 750 MG/1
750 TABLET, FILM COATED ORAL 4 TIMES DAILY
Status: DISCONTINUED | OUTPATIENT
Start: 2024-04-05 | End: 2024-04-06

## 2024-04-05 RX ORDER — ONDANSETRON 8 MG/1
8 TABLET, ORALLY DISINTEGRATING ORAL EVERY 6 HOURS PRN
Status: DISCONTINUED | OUTPATIENT
Start: 2024-04-05 | End: 2024-04-09 | Stop reason: HOSPADM

## 2024-04-05 RX ORDER — ACETAMINOPHEN 10 MG/ML
INJECTION, SOLUTION INTRAVENOUS
Status: DISCONTINUED | OUTPATIENT
Start: 2024-04-05 | End: 2024-04-05

## 2024-04-05 RX ORDER — ONDANSETRON HYDROCHLORIDE 2 MG/ML
INJECTION, SOLUTION INTRAVENOUS
Status: DISCONTINUED | OUTPATIENT
Start: 2024-04-05 | End: 2024-04-05

## 2024-04-05 RX ORDER — HALOPERIDOL 5 MG/ML
INJECTION INTRAMUSCULAR
Status: DISCONTINUED | OUTPATIENT
Start: 2024-04-05 | End: 2024-04-05

## 2024-04-05 RX ORDER — MUPIROCIN 20 MG/G
OINTMENT TOPICAL 2 TIMES DAILY
Status: DISCONTINUED | OUTPATIENT
Start: 2024-04-05 | End: 2024-04-05

## 2024-04-05 RX ORDER — KETAMINE HCL IN 0.9 % NACL 50 MG/5 ML
SYRINGE (ML) INTRAVENOUS
Status: DISCONTINUED | OUTPATIENT
Start: 2024-04-05 | End: 2024-04-05

## 2024-04-05 RX ORDER — VANCOMYCIN HYDROCHLORIDE 1 G/20ML
INJECTION, POWDER, LYOPHILIZED, FOR SOLUTION INTRAVENOUS
Status: DISCONTINUED | OUTPATIENT
Start: 2024-04-05 | End: 2024-04-05 | Stop reason: HOSPADM

## 2024-04-05 RX ORDER — LIDOCAINE 50 MG/G
2 PATCH TOPICAL DAILY
Status: DISCONTINUED | OUTPATIENT
Start: 2024-04-06 | End: 2024-04-09 | Stop reason: HOSPADM

## 2024-04-05 RX ORDER — PROCHLORPERAZINE EDISYLATE 5 MG/ML
5 INJECTION INTRAMUSCULAR; INTRAVENOUS EVERY 6 HOURS PRN
Status: DISCONTINUED | OUTPATIENT
Start: 2024-04-05 | End: 2024-04-09 | Stop reason: HOSPADM

## 2024-04-05 RX ORDER — ROCURONIUM BROMIDE 10 MG/ML
INJECTION, SOLUTION INTRAVENOUS
Status: DISCONTINUED | OUTPATIENT
Start: 2024-04-05 | End: 2024-04-05

## 2024-04-05 RX ORDER — ENOXAPARIN SODIUM 100 MG/ML
40 INJECTION SUBCUTANEOUS EVERY 24 HOURS
Status: DISCONTINUED | OUTPATIENT
Start: 2024-04-06 | End: 2024-04-09 | Stop reason: HOSPADM

## 2024-04-05 RX ORDER — HYDROMORPHONE HYDROCHLORIDE 1 MG/ML
INJECTION, SOLUTION INTRAMUSCULAR; INTRAVENOUS; SUBCUTANEOUS
Status: COMPLETED
Start: 2024-04-05 | End: 2024-04-05

## 2024-04-05 RX ORDER — MUPIROCIN 20 MG/G
OINTMENT TOPICAL
Status: DISCONTINUED | OUTPATIENT
Start: 2024-04-05 | End: 2024-04-05 | Stop reason: HOSPADM

## 2024-04-05 RX ADMIN — HYDROMORPHONE HYDROCHLORIDE 0.2 MG: 1 INJECTION, SOLUTION INTRAMUSCULAR; INTRAVENOUS; SUBCUTANEOUS at 04:04

## 2024-04-05 RX ADMIN — SODIUM CHLORIDE: 0.9 INJECTION, SOLUTION INTRAVENOUS at 11:04

## 2024-04-05 RX ADMIN — OXYCODONE HYDROCHLORIDE 10 MG: 10 TABLET ORAL at 03:04

## 2024-04-05 RX ADMIN — LIDOCAINE HYDROCHLORIDE 80 MG: 20 INJECTION, SOLUTION EPIDURAL; INFILTRATION; INTRACAUDAL at 11:04

## 2024-04-05 RX ADMIN — SUGAMMADEX 300 MG: 100 INJECTION, SOLUTION INTRAVENOUS at 02:04

## 2024-04-05 RX ADMIN — DIAZEPAM 5 MG: 5 TABLET ORAL at 03:04

## 2024-04-05 RX ADMIN — FENTANYL CITRATE 50 MCG: 50 INJECTION INTRAMUSCULAR; INTRAVENOUS at 03:04

## 2024-04-05 RX ADMIN — FENTANYL CITRATE 50 MCG: 50 INJECTION INTRAMUSCULAR; INTRAVENOUS at 01:04

## 2024-04-05 RX ADMIN — Medication 30 MG: at 11:04

## 2024-04-05 RX ADMIN — HYDROMORPHONE HYDROCHLORIDE 0.2 MG: 1 INJECTION, SOLUTION INTRAMUSCULAR; INTRAVENOUS; SUBCUTANEOUS at 03:04

## 2024-04-05 RX ADMIN — FENTANYL CITRATE 50 MCG: 50 INJECTION INTRAMUSCULAR; INTRAVENOUS at 02:04

## 2024-04-05 RX ADMIN — ROCURONIUM BROMIDE 50 MG: 10 INJECTION, SOLUTION INTRAVENOUS at 11:04

## 2024-04-05 RX ADMIN — HYDROMORPHONE HYDROCHLORIDE 1 MG: 1 INJECTION, SOLUTION INTRAMUSCULAR; INTRAVENOUS; SUBCUTANEOUS at 05:04

## 2024-04-05 RX ADMIN — METHOCARBAMOL 750 MG: 750 TABLET ORAL at 08:04

## 2024-04-05 RX ADMIN — PROPOFOL 50 MG: 10 INJECTION, EMULSION INTRAVENOUS at 12:04

## 2024-04-05 RX ADMIN — HYDROMORPHONE HYDROCHLORIDE 1 MG: 1 INJECTION, SOLUTION INTRAMUSCULAR; INTRAVENOUS; SUBCUTANEOUS at 09:04

## 2024-04-05 RX ADMIN — ACETAMINOPHEN 1000 MG: 10 INJECTION, SOLUTION INTRAVENOUS at 12:04

## 2024-04-05 RX ADMIN — ONDANSETRON 4 MG: 2 INJECTION INTRAMUSCULAR; INTRAVENOUS at 01:04

## 2024-04-05 RX ADMIN — Medication 20 MG: at 11:04

## 2024-04-05 RX ADMIN — FENTANYL CITRATE 100 MCG: 50 INJECTION INTRAMUSCULAR; INTRAVENOUS at 11:04

## 2024-04-05 RX ADMIN — HALOPERIDOL LACTATE 0.5 MG: 5 INJECTION, SOLUTION INTRAMUSCULAR at 12:04

## 2024-04-05 RX ADMIN — ACETAMINOPHEN 1000 MG: 500 TABLET ORAL at 09:04

## 2024-04-05 RX ADMIN — MIDAZOLAM 2 MG: 1 INJECTION INTRAMUSCULAR; INTRAVENOUS at 10:04

## 2024-04-05 RX ADMIN — PROPOFOL 150 MG: 10 INJECTION, EMULSION INTRAVENOUS at 11:04

## 2024-04-05 RX ADMIN — CEFAZOLIN 3 G: 330 INJECTION, POWDER, FOR SOLUTION INTRAMUSCULAR; INTRAVENOUS at 11:04

## 2024-04-05 RX ADMIN — ROCURONIUM BROMIDE 40 MG: 10 INJECTION, SOLUTION INTRAVENOUS at 11:04

## 2024-04-05 RX ADMIN — FENTANYL CITRATE 50 MCG: 50 INJECTION INTRAMUSCULAR; INTRAVENOUS at 12:04

## 2024-04-05 RX ADMIN — METHOCARBAMOL 750 MG: 750 TABLET ORAL at 04:04

## 2024-04-05 RX ADMIN — CEFAZOLIN 2 G: 2 INJECTION, POWDER, FOR SOLUTION INTRAMUSCULAR; INTRAVENOUS at 08:04

## 2024-04-05 RX ADMIN — SODIUM CHLORIDE, SODIUM GLUCONATE, SODIUM ACETATE, POTASSIUM CHLORIDE, MAGNESIUM CHLORIDE, SODIUM PHOSPHATE, DIBASIC, AND POTASSIUM PHOSPHATE: .53; .5; .37; .037; .03; .012; .00082 INJECTION, SOLUTION INTRAVENOUS at 11:04

## 2024-04-05 RX ADMIN — PROPOFOL 50 MG: 10 INJECTION, EMULSION INTRAVENOUS at 11:04

## 2024-04-05 RX ADMIN — OXYCODONE HYDROCHLORIDE 10 MG: 10 TABLET ORAL at 08:04

## 2024-04-05 NOTE — NURSING TRANSFER
Nursing Transfer Note      4/5/2024   4:51 PM    Nurse giving handoff: ASHLEY Hernandez  Nurse receiving handoff: ASHLEY Elise    Reason patient is being transferred: recovery complete     Transfer To:     Transfer via stretcher    Transported by PCT/transport    Order for Tele Monitor? No    Additional Lines: Burk Catheter    Patient belongings transferred with patient: No    Chart send with patient: Yes    Notified: spouse    Patient reassessed at: 4/5/24 @ 0569 (date, time)

## 2024-04-05 NOTE — OP NOTE
DATE OF SURGERY: 4/5/2024             PREOPERATIVE DIAGNOSIS:  1. Hx of prior L4-S1 fusion   2. Hardware failure of bilateral L4, S1 pedicle screws  3. Morbid obesity BMI 39     POSTOPERATIVE DIAGNOSIS:  Same.     PROCEDURE PERFORMED:  1. Posterior spinal revision/fusion L3-5  2. Posterior nonsegmental spinal fixation, L3-5 (Globus)  3. Transforaminal lumbar interbody fusion, L3/4  4.  Application of PEEK expandable interbody spacer L3/4 Globus  5.  Decompression of thecal sac and nerve roots via laminectomy, bilateral foraminotomies, and medial facetectomies, L3/4  6. Jv osteotomy, L3/4  7.  Use of robotic navigation   8.  Use of intraoperative fluoroscopy  9. Use of intraoperative neuro-monitoring with stimulation  10. Synthetic and autologous bone grafting     PRIMARY SURGEON: Gilson Starkey DO    Cosurgeon: Familia Burton MD Ortho spine       ASSISTANT: Rimma Irizarry MD     ANESTHESIA: GETA     ESTIMATED BLOOD LOSS: 200mL     COMPLICATIONS: None     DRAINS: Deep Hemovac x 1, superficial HV     SPECIMENS SENT: None     FINDINGS: none     INDICATIONS:     Dexter Ward is a 53 y.o. male being seen in clinic today for a second opinion regarding worsening lumbar radiculopathy and broken hardware. The patient has a hx of L4-S1 PSF in 2010/2011. States that the surgery alleviated some of his back pain and allowed him to stand up straighter. In 2014 it was noted that the pedicle screw on the right at S1 was broken. Lucency also noted to the bilateral screws at L4/5. Around 2016 he sustained a fall that has since led to a slow progression of back and leg pain R>L despite conservative treatment. His pain management provider has informed him that a SCS trial might be his last option. Describes the pain as constant and aching across the low back with radiation into the right buttock wrapping into the hip and extending down the thigh. Denies left leg pain. Rates the pain as a 5/10. Aggravating factors include  standing and walking. Alleviating factors include sitting. Denies weakness, b/b dysfunction, saddle anesthesia, or gait instability.      Imaging demonstrated that the bilateral L4 pedicle screws were haloed and the right S1 pedicle screw appeared broken at the tulip head.  He did have solid interbody and posterolateral fusion at L5/S1.  He had failed conservative mgmt for his chronic back pain and was offered a L3-5 revision/fusion to stabilize his spine.        Risks, benefits, alternatives, indications and methods were reviewed in detail and the patient wished to proceed. All questions were answered. No guarantees about the results of the procedure were made.      PROCEDURE:     The patient was brought to the operating room where he was intubated and placed under general anesthesia without difficulty.  All lines were placed.  He was placed prone onto a Ottoniel table with appropriate padding of all pressure points.  The lumbar region was marked, prepped and draped in the usual fashion. Lateral xrays were used for localization.  A timeout was performed prior to the procedure.  10 mL of lidocaine with epinephrine were injected into the skin.     A linear midline incision was made from L3-S1 using a #10 blade.  Midline suprafascial dissection was carried out with Bovie electrocautery.     A midline fascial incision was made with Bovie electrocautery.  Subperiosteal dissection was carried out with Bovie electrocautery and Newsome elevators to expose the posterior elementsfrom L3/4 to L5/S1 including the prior hardware.  Set screws and rods were removed bilaterally and passed off the field.  The robotic navigation array was then placed just lateral into the right PSIS.  We then took AP/Lateral images to merge onto the robotic navigation platform where we had preplanned our pedicle screws.   The merge was satisfactory.  The robotic navigation arm was brought into the field and we placed bilateral L3 pedicle screws.  We  then explanted the old hardware however the S1 screw shafts were orphaned as they had broken off just distal to the tulip head.  We upsized the L4 pedicle screws with bilateral 8.5x45mm pedicle screws and replaced the L5 pedicle screws with 6.5x45mm screws.  AP and lateral x-ray confirmed excellent position of the hardware. The screws were stimulated and all stimulated above 15mA except for the right L4 pedicle and left L5 pedicle screws which stimulated at 12mA.   These screws were then removed and the tracts were palpated.  We felt no breeches and the screws were replaced.  We then removed the navigation array from the right PSIS.     Attention was turned to performance of a decompressive laminectomy and foraminotomies at L3/4 using a combination of the Leksell rongeur, Kerrison punches, and curettes.  A Jv osteotomy at L3/4 was performed in standard fashion to widen the neural decompression and to promote lumbar lordosis. At the end of these maneuvers the thecal sac and nerve roots from L3/4 were found to be well decompressed using a Roberts probe.  The amount of bone and ligamentum resected is greater than normal in order to adequately access the disc space for interbody arthrodesis.     Attention was turned to performance of a transforaminal lumbar interbody fusion at L3/4 from a left-sided approach.  A nerve root retractor was used to retract the thecal sac medially and expose the L3/4 disc space.  Epidural veins were cauterized and divided.  An annulotomy was performed with a 15 blade.  A pituitary rongeur was used to remove disc material.  The endplates were prepared with disc azeem, rasps, and curettes.  An expandable trial was then placed into the disc space.  The defect was then packed with autologous bone graft.  We then countersunk our expandable PEEK  cage packed with synthetic bone into the defect and expanded it until it was flush with the endplates and had a snug fit.  AP and lateral x-ray  showed excellent position of all hardware.  Surgiflo was then used for hemostasis.     Titanium rods were then sized, contoured and reduced into the tulip heads. Set screws were placed. Gentle screw compression across the Jv osteotomy was performed to promote lordosis, and the set screws were tightened.  The wound was copiously irrigated with sterile normal saline and a dilute Betadine solution.  The posterior spinal elements from L3-S1 were decorticated bilaterally with a high-speed drill.  Autologous and synthetic bone was placed posteriorly for arthrodesis from L3-S1. One deep Hemovac drain was placed and tunneled out of the wound superiorly where it was secured with vicryl suture.  2 g of vancomycin powder was placed in the subfascial space.  A watertight fascial closure was achieved with interrupted 0 Vicryl sutures and a running Stratafix suture.  The suprafascial layer was then undermined with bovie to create a tension free closure.  A superficial HV drain was left in this space and tunneled out of the wound superiorly where it was secured with vicryl suture.  The soft tissues were closed in layers.  4/0 running monocryl was used for the skin. Preneo tape covered with dermabond was placed over the incision.     The patient appeared to tolerate the procedure well from a hemodynamic and neuro monitoring standpoint.  I was present for all critical portions of the case, and at the end of the case all counts are correct.  He was repositioned supine onto the hospital bed where he was extubated and allowed to emerge from anesthesia without difficulty.  He was sent to the PACU in stable condition for recovery.    Justification of Co Surgeon and Modifier 22:  This was a complex L3-5 revision/fusion in a patient with morbid obesity.  It was felt that 2 attending surgeons were needed to limit blood loss, accurately revise the hardware and perform the decompression/fusion in order to optimize patient outcomes.

## 2024-04-05 NOTE — INTERVAL H&P NOTE
The patient has been examined and the H&P has been reviewed:    I concur with the findings and no changes have occurred since H&P was written.    Surgery risks, benefits and alternative options discussed and understood by patient/family.    PE:  General: AOx3, GCS E4V5M6   CNII-XII: Intact on exam, PERRL, visual fields grossly intact, EOMi, facial sensation preserved, no facial assymetry, tongue/uvula/palate midline, shoulder shrug equal, no pronator drift   Extremities: 5/5 motor throughout, sensorium intact throughout, coordination intact throughout, DTRs 2+, no pathological reflexes, no sensory level present     General: Awake, Alert, Oriented   Head: Non-traumatic, normocephalic   Eyes: Pupils equal, EOMi   Neck: Supple, normal ROM, no tenderness to palpation   CVS: Normal rate and rhythm, distal pulses present   Pulm: Symmetric expansion, no respiratory distress   GI: Abdomen nondistended, nontender   MSK: Moves all extremities without restriction, atraumatic   Skin: Dry, intact   Psych: Normal thought content and cognition       There are no hospital problems to display for this patient.

## 2024-04-05 NOTE — PLAN OF CARE
Certification of Assistant at Surgery       Surgery Date: 4/5/2024     Participating Surgeons:  Surgeon(s) and Role:     * Gilson Starkey, DO - Primary     * Familia Burton MD - Assisting     * Rimma Irizarry MD - Resident - Assisting     * Marcelle Fontana PA-C - Assisting     Procedures:  Procedure(s) (LRB):  ROBOTIC REVISION L3-S1 FUSION (N/A)    Assistant Surgeon's Certification of Necessity:  I understand that section 1842 (b) (6) (d) of the Social Security Act generally prohibits Medicare Part B reasonable charge payment for the services of assistants at surgery in teaching hospitals when qualified residents are available to furnish such services. I certify that the services for which payment is claimed were medically necessary, and that no qualified resident was available to perform the services. I further understand that these services are subject to post-payment review by the Medicare carrier.      Marcelle Fontana PA-C    04/05/2024  3:46 PM

## 2024-04-05 NOTE — TRANSFER OF CARE
Anesthesia Transfer of Care Note    Patient: Dexter Ward    Procedure(s) Performed: Procedure(s) (LRB):  ROBOTIC REVISION L3-S1 FUSION (N/A)    Patient location: PACU    Anesthesia Type: general    Transport from OR: Transported from OR on 6-10 L/min O2 by face mask with adequate spontaneous ventilation    Post pain: adequate analgesia    Post assessment: no apparent anesthetic complications and tolerated procedure well    Post vital signs: stable    Level of consciousness: awake    Nausea/Vomiting: no nausea/vomiting    Complications: none    Transfer of care protocol was followed      Last vitals: Visit Vitals  /80 (BP Location: Left arm, Patient Position: Lying)   Pulse 86   Temp 37.1 °C (98.8 °F) (Oral)   Resp 20   Ht 6' (1.829 m)   Wt 131.5 kg (290 lb)   SpO2 98%   BMI 39.33 kg/m²

## 2024-04-05 NOTE — BRIEF OP NOTE
[facility] - Surgery ([facility])  Brief Operative Note    SUMMARY     Surgery Date: 4/5/2024     Surgeon(s) and Role:     * Gilson Starkey, DO - Primary     * Familia Burton MD - Assisting     * Rimma Irizarry MD - Resident - Assisting     * Marcelle Fontana PA-C - Assisting         Pre-op Diagnosis:  Pseudarthrosis after fusion or arthrodesis [M96.0]    Post-op Diagnosis:  Post-Op Diagnosis Codes:     * Pseudarthrosis after fusion or arthrodesis [M96.0]    Procedure(s) (LRB):  ROBOTIC REVISION L3-S1 FUSION (N/A)    Anesthesia: General    Implants:  Implant Name Type Inv. Item Serial No.  Lot No. LRB No. Used Action   PUTTY OSTEOSELECT DBM SYR 5CC - [id number]  PUTTY OSTEOSELECT DBM SYR 5CC [id number] BACTERIN  N/A 1 Implanted   BONE 30CC CANCELLOUS CRUSHED - [id number]  BONE 30CC CANCELLOUS CRUSHED [id number] MUSCULOSKELETAL TRANSPLANT FND  N/A 1 Implanted   BONE 30CC CANCELLOUS CRUSHED - [id number]  BONE 30CC CANCELLOUS CRUSHED [id number] MUSCULOSKELETAL TRANSPLANT FND  N/A 1 Implanted   GURPREET CREO SPINE CRV TI 5.5X80MM - [id number]  GURPREET CREO SPINE CRV TI 5.5X80MM  GLOBUS  N/A 2 Implanted   CALIBER SPACER 10 X 26MM, 12-17, 15    GLOBUS  N/A 1 Implanted   SCREW BONE SPINAL CREO 6.5X45 - [id number]  SCREW BONE SPINAL CREO 6.5X45  GLOBUS  N/A 2 Implanted   CREO THREADED 8.5X45MM POLYAXIAL SCREW      N/A 2 Implanted   CREO THREADED 8.5X50MM POLYAXIAL SCREW    GLOBUS  N/A 2 Implanted   SCREW BONE SPINAL CREO 6.5X50 - [id number]  SCREW BONE SPINAL CREO 6.5X50  GLOBUS  N/A 2 Implanted   CAP SPINAL LOCK THRD CREO 5.5 - [id number]  CAP SPINAL LOCK THRD CREO 5.5  GLOBUS  N/A 6 Implanted       Operative Findings: see op report     Estimated Blood Loss: * No values recorded between 4/5/2024 11:53 AM and 4/5/2024  2:59 PM *    Estimated Blood Loss has been documented.         Specimens:   Specimen (24h ago, onward)      None            [id number]

## 2024-04-05 NOTE — NURSING
Nurses Note -- 4 Eyes      4/5/2024   5:49 PM      Skin assessed during: Transfer from PACU      [] No Altered Skin Integrity Present    []Prevention Measures Documented      [x] Yes- Altered Skin Integrity Present or Discovered   [x] LDA Added if Not in Epic (Describe Wound)   [] New Altered Skin Integrity was Present on Admit and Documented in LDA   [] Wound Image Taken    Wound Care Consulted? No- post surgery. Wound to back with 2 hemo drains.    Attending Nurse:  ASHLEY Guerrero     Second RN/Staff Member:   ASHLEY Fuentes

## 2024-04-05 NOTE — ANESTHESIA PREPROCEDURE EVALUATION
[facility]  Anesthesia Pre-Operative Evaluation         Patient Name/: Dexter Ward, 1970  MRN: [id number]    SUBJECTIVE:     Pre-operative evaluation for Procedure(s) (LRB):  ROBOTIC REVISION L3-S1 FUSION (N/A)     2024    Dexter Ward is a 53 y.o. male     Patient now presents for the above procedure(s).    ________________________________________  No results found for this or any previous visit.    ________________________________________    LDA:        Peripheral IV - Single Lumen 24 0840 18 G Left;Posterior Hand (Active)   Site Assessment Clean;Dry;Intact;No redness;No swelling 24 0834   Extremity Assessment Distal to IV No warmth;No swelling;No redness;No abnormal discoloration 24   Line Status Blood return noted;Flushed;Infusing 24   Dressing Status Clean;Dry;Intact 24 08   Dressing Intervention First dressing 24 08   Number of days: 0       Drips:       Patient Active Problem List   Diagnosis    Attention deficit hyperactivity disorder, combined type    Type 2 diabetes mellitus    Hyperlipidemia    Hypothyroidism    Irritable bowel syndrome with diarrhea    Major depression, recurrent    Memory impairment    Migraine    Chronic pain    Class 2 severe obesity with serious comorbidity and body mass index (BMI) of 39.0 to 39.9 in adult    Pseudarthrosis after fusion or arthrodesis       Review of patient's allergies indicates:   Allergen Reactions    Prazosin Hives, Rash and Shortness Of Breath    Metformin Diarrhea    Opioids - morphine analogues     Morphine Hallucinations, Itching and Nausea And Vomiting       Current Inpatient Medications:    mupirocin  1 g Nasal BID       No current facility-administered medications on file prior to encounter.     Current Outpatient Medications on File Prior to Encounter   Medication Sig Dispense Refill    atorvastatin (LIPITOR) 10 MG tablet TAKE ONE-HALF TABLET BY MOUTH EVERY  DAY FOR CHOLESTEROL      cholecalciferol, vitamin D3, (VITAMIN D3) 50 mcg (2,000 unit) Tab TAKE ONE TABLET BY MOUTH DAILY AS A VITAMIN D3 SUPPLEMENT      diclofenac sodium (VOLTAREN) 1 % Gel Apply topically.      HYDROcodone-acetaminophen (NORCO) 7.5-325 mg per tablet Take 1 tablet by mouth every 6 (six) hours as needed.      levothyroxine (SYNTHROID) 100 MCG tablet 100 mcg before breakfast.      LIDOcaine (LIDODERM) 5 % 2 patches as needed.      methylphenidate HCl (RITALIN) 20 MG tablet Take 20 mg by mouth 2 (two) times daily.      naloxone (NARCAN) 4 mg/actuation Spry SMARTSIG:Both Nares      semaglutide (OZEMPIC) 2 mg/dose (8 mg/3 mL) PnIj Sundays         Past Surgical History:   Procedure Laterality Date    ANKLE SURGERY Bilateral     x 2- right;; 9 to left    BACK SURGERY      multiple    KNEE ARTHROSCOPY Left        Social History:  Tobacco Use: Low Risk  (4/5/2024)    Patient History     Smoking Tobacco Use: Never     Smokeless Tobacco Use: Never     Passive Exposure: Not on file       Alcohol Use: Not on file       OBJECTIVE:     Vital Signs Range:  BMI Readings from Last 1 Encounters:   04/05/24 39.33 kg/m²       Temp:  [37.1 °C (98.8 °F)]   Pulse:  [86]   Resp:  [20]   BP: (125)/(80)   SpO2:  [98 %]        Significant Labs:        Component Value Date/Time    WBC 9.88 04/02/2024 1020    HGB 15.6 04/02/2024 1020    HCT 44.5 04/02/2024 1020     04/02/2024 1020     04/02/2024 1020    K 4.2 04/02/2024 1020     04/02/2024 1020    CO2 23 04/02/2024 1020     04/02/2024 1020    BUN 9 04/02/2024 1020    CREATININE 0.9 04/02/2024 1020    CALCIUM 9.7 04/02/2024 1020    INR 1.0 04/02/2024 1020    HGBA1C 5.2 04/02/2024 1020        Please see Results Review for additional labs.     Diagnostic Studies: No relevant studies.    EKG:   No results found for this or any previous visit.    ECHO:  See subjective, if available.      ASSESSMENT/PLAN:                                                                                                                   04/05/2024  Dexter Ward is a 53 y.o., male.      Pre-op Assessment          Review of Systems  Anesthesia Hx:  No problems with previous Anesthesia                Cardiovascular:                hyperlipidemia                             Musculoskeletal:         Spine Disorders: lumbar            Endocrine:  Diabetes, type 2 Hypothyroidism        Morbid Obesity / BMI > 40         Anesthesia Plan  Type of Anesthesia, risks & benefits discussed:    Anesthesia Type: Gen ETT  Intra-op Monitoring Plan: Standard ASA Monitors and Art Line  Post Op Pain Control Plan: multimodal analgesia  Induction:  IV  Informed Consent: Informed consent signed with the Patient and all parties understand the risks and agree with anesthesia plan.  All questions answered.   ASA Score: 3  Day of Surgery Review of History & Physical: H&P Update referred to the surgeon/provider.    Ready For Surgery From Anesthesia Perspective.     .

## 2024-04-06 LAB
ANION GAP SERPL CALC-SCNC: 10 MMOL/L (ref 8–16)
BASOPHILS # BLD AUTO: 0.07 K/UL (ref 0–0.2)
BASOPHILS NFR BLD: 0.3 % (ref 0–1.9)
BUN SERPL-MCNC: 9 MG/DL (ref 6–20)
CALCIUM SERPL-MCNC: 9.1 MG/DL (ref 8.7–10.5)
CHLORIDE SERPL-SCNC: 101 MMOL/L (ref 95–110)
CO2 SERPL-SCNC: 18 MMOL/L (ref 23–29)
CREAT SERPL-MCNC: 0.9 MG/DL (ref 0.5–1.4)
DIFFERENTIAL METHOD BLD: ABNORMAL
EOSINOPHIL # BLD AUTO: 0 K/UL (ref 0–0.5)
EOSINOPHIL NFR BLD: 0 % (ref 0–8)
ERYTHROCYTE [DISTWIDTH] IN BLOOD BY AUTOMATED COUNT: 13.3 % (ref 11.5–14.5)
EST. GFR  (NO RACE VARIABLE): >60 ML/MIN/1.73 M^2
GLUCOSE SERPL-MCNC: 180 MG/DL (ref 70–110)
HCT VFR BLD AUTO: 42.9 % (ref 40–54)
HGB BLD-MCNC: 14.6 G/DL (ref 14–18)
IMM GRANULOCYTES # BLD AUTO: 0.16 K/UL (ref 0–0.04)
IMM GRANULOCYTES NFR BLD AUTO: 0.6 % (ref 0–0.5)
LYMPHOCYTES # BLD AUTO: 2.1 K/UL (ref 1–4.8)
LYMPHOCYTES NFR BLD: 7.7 % (ref 18–48)
MCH RBC QN AUTO: 32.3 PG (ref 27–31)
MCHC RBC AUTO-ENTMCNC: 34 G/DL (ref 32–36)
MCV RBC AUTO: 95 FL (ref 82–98)
MONOCYTES # BLD AUTO: 2.9 K/UL (ref 0.3–1)
MONOCYTES NFR BLD: 10.6 % (ref 4–15)
NEUTROPHILS # BLD AUTO: 21.9 K/UL (ref 1.8–7.7)
NEUTROPHILS NFR BLD: 80.8 % (ref 38–73)
NRBC BLD-RTO: 0 /100 WBC
PLATELET # BLD AUTO: 279 K/UL (ref 150–450)
PLATELET BLD QL SMEAR: ABNORMAL
PMV BLD AUTO: 9.9 FL (ref 9.2–12.9)
POCT GLUCOSE: 156 MG/DL (ref 70–110)
POCT GLUCOSE: 161 MG/DL (ref 70–110)
POCT GLUCOSE: 167 MG/DL (ref 70–110)
POCT GLUCOSE: 167 MG/DL (ref 70–110)
POTASSIUM SERPL-SCNC: 3.8 MMOL/L (ref 3.5–5.1)
RBC # BLD AUTO: 4.52 M/UL (ref 4.6–6.2)
SODIUM SERPL-SCNC: 129 MMOL/L (ref 136–145)
TOXIC GRANULES BLD QL SMEAR: PRESENT
WBC # BLD AUTO: 27.14 K/UL (ref 3.9–12.7)

## 2024-04-06 PROCEDURE — 63600175 PHARM REV CODE 636 W HCPCS: Performed by: PHYSICIAN ASSISTANT

## 2024-04-06 PROCEDURE — 63600175 PHARM REV CODE 636 W HCPCS: Mod: JZ,JG | Performed by: STUDENT IN AN ORGANIZED HEALTH CARE EDUCATION/TRAINING PROGRAM

## 2024-04-06 PROCEDURE — 11000001 HC ACUTE MED/SURG PRIVATE ROOM

## 2024-04-06 PROCEDURE — 97116 GAIT TRAINING THERAPY: CPT

## 2024-04-06 PROCEDURE — 80048 BASIC METABOLIC PNL TOTAL CA: CPT | Performed by: STUDENT IN AN ORGANIZED HEALTH CARE EDUCATION/TRAINING PROGRAM

## 2024-04-06 PROCEDURE — 25000003 PHARM REV CODE 250: Performed by: PHYSICIAN ASSISTANT

## 2024-04-06 PROCEDURE — 97535 SELF CARE MNGMENT TRAINING: CPT

## 2024-04-06 PROCEDURE — 97530 THERAPEUTIC ACTIVITIES: CPT

## 2024-04-06 PROCEDURE — 85025 COMPLETE CBC W/AUTO DIFF WBC: CPT | Performed by: STUDENT IN AN ORGANIZED HEALTH CARE EDUCATION/TRAINING PROGRAM

## 2024-04-06 PROCEDURE — 25000003 PHARM REV CODE 250: Performed by: STUDENT IN AN ORGANIZED HEALTH CARE EDUCATION/TRAINING PROGRAM

## 2024-04-06 PROCEDURE — 97166 OT EVAL MOD COMPLEX 45 MIN: CPT

## 2024-04-06 PROCEDURE — 93005 ELECTROCARDIOGRAM TRACING: CPT

## 2024-04-06 PROCEDURE — 93010 ELECTROCARDIOGRAM REPORT: CPT | Mod: ,,, | Performed by: INTERNAL MEDICINE

## 2024-04-06 PROCEDURE — 97161 PT EVAL LOW COMPLEX 20 MIN: CPT

## 2024-04-06 PROCEDURE — 25000003 PHARM REV CODE 250

## 2024-04-06 RX ORDER — PREGABALIN 75 MG/1
75 CAPSULE ORAL 2 TIMES DAILY
Status: DISCONTINUED | OUTPATIENT
Start: 2024-04-06 | End: 2024-04-06

## 2024-04-06 RX ORDER — SODIUM CHLORIDE 9 MG/ML
INJECTION, SOLUTION INTRAVENOUS CONTINUOUS
Status: ACTIVE | OUTPATIENT
Start: 2024-04-06 | End: 2024-04-07

## 2024-04-06 RX ORDER — LABETALOL HCL 20 MG/4 ML
10 SYRINGE (ML) INTRAVENOUS EVERY 6 HOURS PRN
Status: DISCONTINUED | OUTPATIENT
Start: 2024-04-06 | End: 2024-04-07

## 2024-04-06 RX ORDER — LABETALOL HCL 20 MG/4 ML
10 SYRINGE (ML) INTRAVENOUS ONCE
Status: COMPLETED | OUTPATIENT
Start: 2024-04-06 | End: 2024-04-06

## 2024-04-06 RX ORDER — ACETAMINOPHEN 325 MG/1
650 TABLET ORAL EVERY 6 HOURS PRN
Status: DISCONTINUED | OUTPATIENT
Start: 2024-04-06 | End: 2024-04-08

## 2024-04-06 RX ORDER — METHOCARBAMOL 500 MG/1
1000 TABLET, FILM COATED ORAL 4 TIMES DAILY
Status: DISCONTINUED | OUTPATIENT
Start: 2024-04-06 | End: 2024-04-08

## 2024-04-06 RX ADMIN — ENOXAPARIN SODIUM 40 MG: 40 INJECTION SUBCUTANEOUS at 04:04

## 2024-04-06 RX ADMIN — METHOCARBAMOL 1000 MG: 500 TABLET ORAL at 08:04

## 2024-04-06 RX ADMIN — METHOCARBAMOL 750 MG: 750 TABLET ORAL at 08:04

## 2024-04-06 RX ADMIN — INSULIN ASPART 2 UNITS: 100 INJECTION, SOLUTION INTRAVENOUS; SUBCUTANEOUS at 11:04

## 2024-04-06 RX ADMIN — OXYCODONE HYDROCHLORIDE 10 MG: 10 TABLET ORAL at 12:04

## 2024-04-06 RX ADMIN — INSULIN ASPART 2 UNITS: 100 INJECTION, SOLUTION INTRAVENOUS; SUBCUTANEOUS at 08:04

## 2024-04-06 RX ADMIN — HYDROMORPHONE HYDROCHLORIDE 1 MG: 1 INJECTION, SOLUTION INTRAMUSCULAR; INTRAVENOUS; SUBCUTANEOUS at 05:04

## 2024-04-06 RX ADMIN — OXYCODONE HYDROCHLORIDE 10 MG: 10 TABLET ORAL at 04:04

## 2024-04-06 RX ADMIN — LIDOCAINE 5% 2 PATCH: 700 PATCH TOPICAL at 08:04

## 2024-04-06 RX ADMIN — METHOCARBAMOL 1000 MG: 500 TABLET ORAL at 04:04

## 2024-04-06 RX ADMIN — HYDROMORPHONE HYDROCHLORIDE 1 MG: 1 INJECTION, SOLUTION INTRAMUSCULAR; INTRAVENOUS; SUBCUTANEOUS at 01:04

## 2024-04-06 RX ADMIN — METHOCARBAMOL 1000 MG: 500 TABLET ORAL at 12:04

## 2024-04-06 RX ADMIN — ACETAMINOPHEN 1000 MG: 500 TABLET ORAL at 05:04

## 2024-04-06 RX ADMIN — DIAZEPAM 5 MG: 5 TABLET ORAL at 10:04

## 2024-04-06 RX ADMIN — DIAZEPAM 5 MG: 5 TABLET ORAL at 05:04

## 2024-04-06 RX ADMIN — HYDROMORPHONE HYDROCHLORIDE 1 MG: 1 INJECTION, SOLUTION INTRAMUSCULAR; INTRAVENOUS; SUBCUTANEOUS at 10:04

## 2024-04-06 RX ADMIN — ATORVASTATIN CALCIUM 10 MG: 10 TABLET, FILM COATED ORAL at 08:04

## 2024-04-06 RX ADMIN — DIAZEPAM 5 MG: 5 TABLET ORAL at 12:04

## 2024-04-06 RX ADMIN — ACETAMINOPHEN 650 MG: 325 TABLET ORAL at 04:04

## 2024-04-06 RX ADMIN — DOCUSATE SODIUM AND SENNOSIDES 2 TABLET: 8.6; 5 TABLET, FILM COATED ORAL at 08:04

## 2024-04-06 RX ADMIN — INSULIN ASPART 2 UNITS: 100 INJECTION, SOLUTION INTRAVENOUS; SUBCUTANEOUS at 04:04

## 2024-04-06 RX ADMIN — HYDROMORPHONE HYDROCHLORIDE 1 MG: 1 INJECTION, SOLUTION INTRAMUSCULAR; INTRAVENOUS; SUBCUTANEOUS at 09:04

## 2024-04-06 RX ADMIN — OXYCODONE HYDROCHLORIDE 10 MG: 10 TABLET ORAL at 08:04

## 2024-04-06 RX ADMIN — SODIUM CHLORIDE: 9 INJECTION, SOLUTION INTRAVENOUS at 08:04

## 2024-04-06 RX ADMIN — CEFAZOLIN 2 G: 2 INJECTION, POWDER, FOR SOLUTION INTRAMUSCULAR; INTRAVENOUS at 10:04

## 2024-04-06 RX ADMIN — CEFAZOLIN 2 G: 2 INJECTION, POWDER, FOR SOLUTION INTRAMUSCULAR; INTRAVENOUS at 08:04

## 2024-04-06 RX ADMIN — ACETAMINOPHEN 1000 MG: 500 TABLET ORAL at 01:04

## 2024-04-06 RX ADMIN — LABETALOL HYDROCHLORIDE 10 MG: 5 INJECTION, SOLUTION INTRAVENOUS at 04:04

## 2024-04-06 RX ADMIN — CEFAZOLIN 2 G: 2 INJECTION, POWDER, FOR SOLUTION INTRAMUSCULAR; INTRAVENOUS at 04:04

## 2024-04-06 RX ADMIN — LABETALOL HYDROCHLORIDE 10 MG: 5 INJECTION, SOLUTION INTRAVENOUS at 05:04

## 2024-04-06 RX ADMIN — DIAZEPAM 5 MG: 5 TABLET ORAL at 08:04

## 2024-04-06 RX ADMIN — LEVOTHYROXINE SODIUM 100 MCG: 100 TABLET ORAL at 05:04

## 2024-04-06 NOTE — ASSESSMENT & PLAN NOTE
53 M presents for elective L3-5 reivision fusion on 4/5/2024.     - admitted to floor postop  - q4 hour neurochecks  - post op xrays pending  - Multimodal pain control, robaxin, lyrica, tyelnol, prn narcotics  - LSO when OOB  - PT/OT/OOB today  - Diabetic diet  - accuchecks qid for DM  - SQH today  - Ppx: BR, IS, SCDs, TEDs

## 2024-04-06 NOTE — PLAN OF CARE
OT eval completed, POC established and met. Pt is not in need of skilled acute OT at this time, DC OT. Please re-consult if functional status changes.    Problem: Occupational Therapy  Goal: Occupational Therapy Goal  Description: Patient will increase functional independence with ADLs by performing:    UE Dressing with Supervision.  Grooming while standing at sink with Supervision.  Toileting from toilet with Supervision for hygiene and clothing management.   Supine to sit with Stand-by Assistance.  Step transfer with Stand-by Assistance    Outcome: Met

## 2024-04-06 NOTE — PLAN OF CARE
Nathan Barber - Neurosurgery (Hospital)  Initial Discharge Assessment       Primary Care Provider: [facility]    Admission Diagnosis: Pseudarthrosis after fusion or arthrodesis [M96.0]  Lumbar canal stenosis [M48.061]    Admission Date: 4/5/2024  Expected Discharge Date: 4/9/2024    Transition of Care Barriers: None    Payor: [employer] / Plan: [facility] OPTUM / Product Type: Government /     Extended Emergency Contact Information  Primary Emergency Contact: EdMarta  Mobile Phone: [phone]  Relation: Spouse  Preferred language: English   needed? No    Discharge Plan A: Home  Discharge Plan B: Home with family      [place] [facility] - [place] - [address]  [address]  [place] [address]  Phone: [phone] Fax: [phone]      Initial Assessment (most recent)       Adult Discharge Assessment - 04/06/24 0937          Discharge Assessment    Assessment Type Discharge Planning Assessment     Confirmed/corrected address, phone number and insurance Yes     Confirmed Demographics Correct on Facesheet     Source of Information patient     Does patient/caregiver understand observation status Yes     Communicated MARYA with patient/caregiver Yes     Reason For Admission No active principal problem     People in Home spouse     Do you expect to return to your current living situation? Yes     Do you have help at home or someone to help you manage your care at home? Yes     Prior to hospitilization cognitive status: Alert/Oriented     Current cognitive status: Alert/Oriented     Walking or Climbing Stairs Difficulty no     Dressing/Bathing Difficulty no     Equipment Currently Used at Home none     Readmission within 30 days? No     Patient currently being followed by outpatient case management? No     Do you currently have service(s) that help you manage your care at home? No     Do you take prescription medications? Yes     Do you have prescription  coverage? Yes     Coverage Payor: [facility] [employer] -     Do you have any problems affording any of your prescribed medications? No     Is the patient taking medications as prescribed? yes     How do you get to doctors appointments? car, drives self;family or friend will provide     Are you on dialysis? No     Do you take coumadin? No     Discharge Plan A Home     Discharge Plan B Home with family     DME Needed Upon Discharge  none     Discharge Plan discussed with: Patient     Transition of Care Barriers None        Physical Activity    On average, how many days per week do you engage in moderate to strenuous exercise (like a brisk walk)? 2 days     On average, how many minutes do you engage in exercise at this level? 60 min        Financial Resource Strain    How hard is it for you to pay for the very basics like food, housing, medical care, and heating? Not hard at all        Housing Stability    In the last 12 months, was there a time when you were not able to pay the mortgage or rent on time? No     In the last 12 months, was there a time when you did not have a steady place to sleep or slept in a shelter (including now)? No        Transportation Needs    In the past 12 months, has lack of transportation kept you from medical appointments or from getting medications? No     In the past 12 months, has lack of transportation kept you from meetings, work, or from getting things needed for daily living? No        Food Insecurity    Within the past 12 months, you worried that your food would run out before you got the money to buy more. Never true     Within the past 12 months, the food you bought just didn't last and you didn't have money to get more. Never true        Stress    Do you feel stress - tense, restless, nervous, or anxious, or unable to sleep at night because your mind is troubled all the time - these days? Not at all        Social Connections    In a typical week, how many times  do you talk on the phone with family, friends, or neighbors? Three times a week     How often do you get together with friends or relatives? Three times a week     How often do you attend Taoist or Yazdanism services? Never     Do you belong to any clubs or organizations such as Taoist groups, unions, fraternal or athletic groups, or school groups? No     How often do you attend meetings of the clubs or organizations you belong to? Never     Are you , , , , never , or living with a partner?         Alcohol Use    Q1: How often do you have a drink containing alcohol? Never     Q2: How many drinks containing alcohol do you have on a typical day when you are drinking? Patient does not drink     Q3: How often do you have six or more drinks on one occasion? Never                   Spoke to pt. Pt lives at home with his wife. Post hospital  stay wife and family will be pt support person and pt. has transportation at d/c with his wife. There have been no hospitalizations within the last 30 days per pt. Verified pt PCP and preferred pharmacy. Pt stated not on Coumadin and is not receiving dialysis. All questions answered regarding case management/ discharge planning , pt verbalized understanding. Discharge booklet with SW contact information given to pt.     Discharge Plan A and Plan B have been determined by review of patient's clinical status, future medical and therapeutic needs, and coverage/benefits for post-acute care in coordination with multidisciplinary team members.      NIALL Stoner  [facility]  [phone]

## 2024-04-06 NOTE — NURSING
Nurses Note -- 4 Eyes      4/5/2024   7:20 PM      Skin assessed during: Q Shift Change      [] No Altered Skin Integrity Present    [x]Prevention Measures Documented      [x] Yes- Altered Skin Integrity Present or Discovered   [] LDA Added if Not in Epic (Describe Wound)   [x] New Altered Skin Integrity was Present on Admit and Documented in LDA   [] Wound Image Taken    Wound Care Consulted? No    Attending Nurse:  Marcia Hewitt RN/Staff Member:  Tina FARRIS incision to lower back with Hvx2 noted. See LDA flowsheets for details.

## 2024-04-06 NOTE — SUBJECTIVE & OBJECTIVE
Interval History: 4/6: OR yesterday for revision fusion L3-5. Tolerated procedure well. Expected low back pain, no lower extremity pain. Burk removed, pending voiding.     Medications:  Continuous Infusions:  Scheduled Meds:   acetaminophen  1,000 mg Oral Q8H    atorvastatin  10 mg Oral Daily    ceFAZolin (Ancef) IV (PEDS and ADULTS)  2 g Intravenous Q8H    enoxparin  40 mg Subcutaneous Daily    levothyroxine  100 mcg Oral Before breakfast    LIDOcaine  2 patch Transdermal Daily    methocarbamoL  1,000 mg Oral QID    senna-docusate 8.6-50 mg  2 tablet Oral Daily     PRN Meds:aluminum-magnesium hydroxide-simethicone, dextrose 10%, dextrose 10%, diazePAM, glucagon (human recombinant), glucose, glucose, HYDROmorphone, insulin aspart U-100, ondansetron, oxyCODONE, oxyCODONE, prochlorperazine     Review of Systems  Objective:     Weight: (!) 137.8 kg (303 lb 12.7 oz)  Body mass index is 41.2 kg/m².  Vital Signs (Most Recent):  Temp: 99.3 °F (37.4 °C) (04/06/24 1058)  Pulse: (!) 116 (04/06/24 1058)  Resp: 17 (04/06/24 1211)  BP: (!) 143/84 (04/06/24 1058)  SpO2: (!) 92 % (04/06/24 1058) Vital Signs (24h Range):  Temp:  [97.7 °F (36.5 °C)-99.4 °F (37.4 °C)] 99.3 °F (37.4 °C)  Pulse:  [] 116  Resp:  [12-20] 17  SpO2:  [92 %-100 %] 92 %  BP: (133-217)/() 143/84     Date 04/06/24 0700 - 04/07/24 0659   Shift 3839-8486 6937-7902 6395-3862 24 Hour Total   INTAKE   Shift Total(mL/kg)       OUTPUT   Urine(mL/kg/hr) 600   600   Shift Total(mL/kg) 600(4.4)   600(4.4)   Weight (kg) 137.8 137.8 137.8 137.8                            Closed/Suction Drain 04/05/24 1406 Inferior;Medial;Posterior;Right Back Accordion 10 Fr. (Active)   Site Description Healing 04/06/24 0800   Dressing Type Other (Comment) 04/06/24 0800   Dressing Status Other (Comment) 04/06/24 0800   Dressing Intervention Other (Comment) 04/06/24 0800   Drainage Bloody 04/06/24 0800   Status Other (Comment) 04/06/24 0800   Output (mL) 0 mL 04/06/24 0555      "       Closed/Suction Drain 04/05/24 1408 Left;Posterior;Inferior;Medial Back Accordion 10 Fr. (Active)   Site Description Healing 04/06/24 0800   Dressing Type Other (Comment) 04/06/24 0800   Dressing Status Other (Comment) 04/06/24 0800   Dressing Intervention Other (Comment) 04/06/24 0800   Drainage Bloody 04/06/24 0800   Status Other (Comment) 04/06/24 0800   Output (mL) 210 mL 04/06/24 0555          Physical Exam         Neurosurgery Physical Exam    Physical Exam:    Constitutional: No distress.     HEENT: atraumatic/normocephalic    Cardiovascular: Regular rhythm.     Pulm: aerating well, saturating well    Abdominal: Soft.     Psych/Behavior: He is alert.     RUE: 5/5 delt, 5/5 bi, 5/5 tri, 5/5 hg, 5/5 io  LUE: 5/5 delt, 5/5 bi, 5/5 tri, 5/5 hg, 5/5 io  RLE: 5/5 hf, 5/5 quad, 5/5 hamstring, 5/5 df, 5/5 pf  LLE: 5/5 hf, 5/5 quad, 5/5 hamstring, 5/5 df, 5/5 pf    SILT    No hoffmans  No clonus  No babinski      Significant Labs:  Recent Labs   Lab 04/05/24  1808   *   *   K 3.8      CO2 20*   BUN 10   CREATININE 0.9   CALCIUM 9.1     Recent Labs   Lab 04/05/24  1808   WBC 19.54*   HGB 14.7   HCT 41.5        No results for input(s): "LABPT", "INR", "APTT" in the last 48 hours.  Microbiology Results (last 7 days)       ** No results found for the last 168 hours. **          All pertinent labs from the last 24 hours have been reviewed.    Significant Diagnostics:  I have reviewed and interpreted all pertinent imaging results/findings within the past 24 hours.  "

## 2024-04-06 NOTE — HOSPITAL COURSE
4/6: OR yesterday for revision fusion L3-5. Tolerated procedure well. Expected low back pain, no lower extremity pain. Burk removed, pending voiding.   4/7: Tachy yesterday to 150s, workup negative. Denies chest pain / palpitations / SOB. Neruo exam stable. If continued issues today will consult medicine. Drains to remain today.  4/8: NAEON. Vitals WNL today. C/o of significant L knee pain. Has a h/o of L knee pain a surgery on it years ago. States it often gives him trouble. Also states his back pain is only controlled with dilaudid. Dc'ed dilaudid today and adjusted his oral pain medications in order to likely discharge patient home. Ambulating ad silvia. Drains with deep 5 cc and superficial 70 cc output. Will remove if dc today. No BM yet, adding miralax.

## 2024-04-06 NOTE — PLAN OF CARE
Problem: Adult Inpatient Plan of Care  Goal: Plan of Care Review  Outcome: Ongoing, Progressing  Goal: Patient-Specific Goal (Individualized)  Outcome: Ongoing, Progressing  Goal: Absence of Hospital-Acquired Illness or Injury  Outcome: Ongoing, Progressing  Goal: Optimal Comfort and Wellbeing  Outcome: Ongoing, Progressing  Goal: Readiness for Transition of Care  Outcome: Ongoing, Progressing     Problem: Diabetes Comorbidity  Goal: Blood Glucose Level Within Targeted Range  Outcome: Ongoing, Progressing     Problem: Infection  Goal: Absence of Infection Signs and Symptoms  Outcome: Ongoing, Progressing     Problem: Bariatric Environmental Safety  Goal: Safety Maintained with Care  Outcome: Ongoing, Progressing          POC reviewed and updated with the patient at the bedside. Questions regarding POC were encouraged and addressed. VSS, see flowsheets. Patient is AOx 4 at this time. Fall and safety precautions maintained, no signs of injury noted during shift. 2 hemovacs to bulb suction , see flowsheets for output. Pain management utilizing PRN pain medications effective, see MAR for administration details. Patient repositioned independently/ with assistance for comfort with bed locked in low position, side rails up x 2, bed alarm on, with call light within reach. Instructed patient to call staff for mobility, verbalized understanding. No acute signs of distress noted at this time.

## 2024-04-06 NOTE — PT/OT/SLP EVAL
"Occupational Therapy   Co-Evaluation and Discharge Note    Name: Dexter Ward  MRN: [id number]  Admitting Diagnosis: <principal problem not specified>  Recent Surgery: Procedure(s) (LRB):  ROBOTIC REVISION L3-S1 FUSION (N/A) 1 Day Post-Op    Recommendations:     Discharge Recommendations: No Therapy Indicated  Discharge Equipment Recommendations: walker, rolling (bariatric RW)  Barriers to discharge:  None  Justification for Bariatric Walker HME   The mobility limitation cannot be sufficiently resolved by the use of a cane.   Patient's functional mobility deficit can be sufficiently resolved with the use of a bariatric walker.  Patient's mobility limitation significantly impairs their ability to participate in one of more activities of daily living.  The use of a bariatric walker will significantly improve the patients ability to participate in MRADLS and the patient will use it on regular basis in the home.     Assessment:     Dexter Ward is a 53 y.o. male with a medical diagnosis of <principal problem not specified>. At this time, patient is functioning at their prior level of function and does not require further acute OT services.     Plan:     During this hospitalization, patient does not require further acute OT services.  Please re-consult if situation changes.    Plan of Care Reviewed with: patient, spouse    Subjective     Chief Complaint: pain  Patient/Family Comments/goals: "I'm determined"    Occupational Profile:  Living Environment: Patient lives in a 2 story home  with his wife and 2 teenage children. There are 2 small steps to enter with no railing and bed/bath are on first floor. Bathroom setup consists of a walk-in shower with no AD.    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  Patient found HOB elevated with bed alarm, peripheral IV, hemovac upon OT entry to room.    General Precautions: Standard, fall  Orthopedic Precautions: spinal precautions  Braces: LSO  Respiratory Status: Room air     Occupational Performance:    Bed Mobility:    Patient completed Scooting/Bridging with stand by assistance  Patient completed Supine to Sit with stand by assistance    Functional Mobility/Transfers:  Patient completed Sit <> Stand Transfer with stand by assistance  with  no assistive device   Functional Mobility: Pt engaging in functional mobility to simulate household/community distances approx 15' with CGA and utilizing HHA, 110' SBA with RW in order to maximize functional activity tolerance and standing balance required for engagement in occupations of choice.    Activities of Daily Living:  Grooming: supervision to complete hand hygiene, face hygiene in stance at sink  Upper Body Dressing: supervision to don gown as montrell sue LSO  Lower Body Dressing: minimum assistance to don socks - pt reports wife can assist  Toileting: supervision to void in stance at sink    Cognitive/Visual Perceptual:  Cognitive/Psychosocial Skills:     -       Oriented to: Person, Place, Time, and Situation   -       Follows Commands/attention:Follows one-step commands  -       Communication: clear/fluent  -       Safety awareness/insight to disability: intact   Visual/Perceptual:      -Intact      Physical Exam:  Dominant hand:    -       ambidextrous  Upper Extremity Range of Motion:     -       Right Upper Extremity: WNL  -       Left Upper Extremity: WNL  Upper Extremity Strength:    -       Right Upper  Extremity: WNL  -       Left Upper Extremity: WNL   Strength:    -       Right Upper Extremity: WNL  -       Left Upper Extremity: WNL  Fine Motor Coordination:    -       Intact    AMPAC 6 Click ADL:  AMPAC Total Score: 23    Treatment & Education:  - role of OT, OT POC  - spinal precautions, including no bending, lifting >5lbs, or twisting and how to adhere to them with compensatory techniques while performing various ADLs.   - log roll techniques  - application of brace and brace wearing schedule  - use of call light/importance of calling for staff assist for mobility  - All pt questions within OT scope of practice addressed.  - Whiteboard updated       Patient left sitting edge of bed with all lines intact, call button in reach, RN notified, and wife present    GOALS:   Multidisciplinary Problems       Occupational Therapy Goals       Not on file              Multidisciplinary Problems (Resolved)          Problem: Occupational Therapy    Goal Priority Disciplines Outcome Interventions   Occupational Therapy Goal   (Resolved)     OT, PT/OT Met    Description: Patient will increase functional independence with ADLs by performing:    UE Dressing with Supervision.  Grooming while standing at sink with Supervision.  Toileting from toilet with Supervision for hygiene and clothing management.   Supine to sit with Stand-by Assistance.  Step transfer with Stand-by Assistance                         History:     Past Medical History:   Diagnosis Date    Depression     Diabetes mellitus, type 2     Hyperlipidemia     Hypothyroidism     Pulmonary embolism          Past Surgical History:   Procedure Laterality Date    ANKLE SURGERY Bilateral     x 2- right;; 9 to left    BACK SURGERY      multiple    KNEE ARTHROSCOPY Left        Time Tracking:     OT Date of Treatment: 04/06/24  OT Start Time: 0938  OT Stop Time: 1011  OT Total Time (min): 33 min    Billable Minutes:Evaluation 10  Self Care/Home Management  13  Therapeutic Activity 10    4/6/2024

## 2024-04-06 NOTE — PROGRESS NOTES
Nathan Barber - Neurosurgery ([facility])  Neurosurgery  Progress Note    Subjective:     History of Present Illness: 53 M presents for elective L3-5 reivision fusion on 4/5/2024.     Post-Op Info:  Procedure(s) (LRB):  ROBOTIC REVISION L3-S1 FUSION (N/A)   1 Day Post-Op   Interval History: 4/6: OR yesterday for revision fusion L3-5. Tolerated procedure well. Expected low back pain, no lower extremity pain. Burk removed, pending voiding.     Medications:  Continuous Infusions:  Scheduled Meds:   acetaminophen  1,000 mg Oral Q8H    atorvastatin  10 mg Oral Daily    ceFAZolin (Ancef) IV (PEDS and ADULTS)  2 g Intravenous Q8H    enoxparin  40 mg Subcutaneous Daily    levothyroxine  100 mcg Oral Before breakfast    LIDOcaine  2 patch Transdermal Daily    methocarbamoL  1,000 mg Oral QID    senna-docusate 8.6-50 mg  2 tablet Oral Daily     PRN Meds:aluminum-magnesium hydroxide-simethicone, dextrose 10%, dextrose 10%, diazePAM, glucagon (human recombinant), glucose, glucose, HYDROmorphone, insulin aspart U-100, ondansetron, oxyCODONE, oxyCODONE, prochlorperazine     Review of Systems  Objective:     Weight: (!) 137.8 kg (303 lb 12.7 oz)  Body mass index is 41.2 kg/m².  Vital Signs (Most Recent):  Temp: 99.3 °F (37.4 °C) (04/06/24 1058)  Pulse: (!) 116 (04/06/24 1058)  Resp: 17 (04/06/24 1211)  BP: (!) 143/84 (04/06/24 1058)  SpO2: (!) 92 % (04/06/24 1058) Vital Signs (24h Range):  Temp:  [97.7 °F (36.5 °C)-99.4 °F (37.4 °C)] 99.3 °F (37.4 °C)  Pulse:  [] 116  Resp:  [12-20] 17  SpO2:  [92 %-100 %] 92 %  BP: (133-217)/() 143/84     Date 04/06/24 0700 - 04/07/24 0659   Shift 2269-5342 0788-7956 5717-3370 24 Hour Total   INTAKE   Shift Total(mL/kg)       OUTPUT   Urine(mL/kg/hr) 600   600   Shift Total(mL/kg) 600(4.4)   600(4.4)   Weight (kg) 137.8 137.8 137.8 137.8                            Closed/Suction Drain 04/05/24 1406 Inferior;Medial;Posterior;Right Back Accordion 10 Fr. (Active)   Site Description Healing  "04/06/24 0800   Dressing Type Other (Comment) 04/06/24 0800   Dressing Status Other (Comment) 04/06/24 0800   Dressing Intervention Other (Comment) 04/06/24 0800   Drainage Bloody 04/06/24 0800   Status Other (Comment) 04/06/24 0800   Output (mL) 0 mL 04/06/24 0555            Closed/Suction Drain 04/05/24 1408 Left;Posterior;Inferior;Medial Back Accordion 10 Fr. (Active)   Site Description Healing 04/06/24 0800   Dressing Type Other (Comment) 04/06/24 0800   Dressing Status Other (Comment) 04/06/24 0800   Dressing Intervention Other (Comment) 04/06/24 0800   Drainage Bloody 04/06/24 0800   Status Other (Comment) 04/06/24 0800   Output (mL) 210 mL 04/06/24 0555          Physical Exam         Neurosurgery Physical Exam    Physical Exam:    Constitutional: No distress.     HEENT: atraumatic/normocephalic    Cardiovascular: Regular rhythm.     Pulm: aerating well, saturating well    Abdominal: Soft.     Psych/Behavior: He is alert.     RUE: 5/5 delt, 5/5 bi, 5/5 tri, 5/5 hg, 5/5 io  LUE: 5/5 delt, 5/5 bi, 5/5 tri, 5/5 hg, 5/5 io  RLE: 5/5 hf, 5/5 quad, 5/5 hamstring, 5/5 df, 5/5 pf  LLE: 5/5 hf, 5/5 quad, 5/5 hamstring, 5/5 df, 5/5 pf    SILT    No hoffmans  No clonus  No babinski      Significant Labs:  Recent Labs   Lab 04/05/24  1808   *   *   K 3.8      CO2 20*   BUN 10   CREATININE 0.9   CALCIUM 9.1     Recent Labs   Lab 04/05/24  1808   WBC 19.54*   HGB 14.7   HCT 41.5        No results for input(s): "LABPT", "INR", "APTT" in the last 48 hours.  Microbiology Results (last 7 days)       ** No results found for the last 168 hours. **          All pertinent labs from the last 24 hours have been reviewed.    Significant Diagnostics:  I have reviewed and interpreted all pertinent imaging results/findings within the past 24 hours.  Assessment/Plan:     Pseudarthrosis after fusion or arthrodesis  53 M presents for elective L3-5 reivision fusion on 4/5/2024.     - admitted to floor postop  - q4 " hour neurochecks  - post op xrays pending  - Multimodal pain control, robaxin, lyrica, tyelnol, prn narcotics  - LSO when OOB  - PT/OT/OOB today  - Diabetic diet  - accuchecks qid for DM  - SQH today  - Ppx: BR, IS, SCDs, TEDs        Becca Jones MD  Neurosurgery  Nathan Barber - Neurosurgery ([facility])

## 2024-04-06 NOTE — PLAN OF CARE
POC established and functional mobility goals were created to help pt return to PLOF. Will be reassessed as appropriate to measure pt progress.    Problem: Physical Therapy  Goal: Physical Therapy Goal  Description: Goals to be met by: 24     Patient will increase functional independence with mobility by performin. Supine to sit with Modified Angelina  2. Sit to supine with Modified Angelina  3. Sit to stand transfer with Modified Angelina  4. Bed to chair transfer with Modified Angelina using LRAD as needed  5. Gait  x 150 feet with Modified Angelina using LRAD as needed.   6. Ascend/descend 2 stair with no Handrails with Supervision   7. Lower extremity exercise program x15 reps per handout, with assistance as needed    Outcome: Ongoing, Progressing

## 2024-04-06 NOTE — PT/OT/SLP EVAL
Physical Therapy Co-Evaluation and Co-Treatment with OT    Patient Name:  Dexter Ward   MRN:  [id number]    Recent Surgery: Procedure(s) (LRB):  ROBOTIC REVISION L3-S1 FUSION (N/A) 1 Day Post-Op    Patient required co-tx with OT secondary to need for multiple set of skilled hands to provide safest therapy and best outcomes.      Recommendations:     Discharge Recommendations:   Low intensity therapy   Discharge Equipment Recommendations: walker, rolling (bariatric)   Barriers to discharge: None    Highest Level of Mobility: Gait 110'  Assistance Required: SBA w/ RW    Assessment:     Dexter Ward is a 53 y.o. male admitted with a medical diagnosis of <principal problem not specified>. He presents with the following impairments/functional limitations:  impaired endurance, orthopedic precautions, pain, impaired balance, decreased lower extremity function    Pt met with HOB elevated, spouse present and agreeable to PT session. Pt reports his PLOF is (I) with functional mobility and ADLs using no DME. Currently, pt requires SBA to ambulate with RW. He is limited on this date by increased pain during mobility but he participates well. Improved gait stability noted with RW as compared to no AD, so a RW is recommended at d/c to reduce fall risk.    Pt would benefit from continued skilled acute PT 3x/wk to address above listed functional deficits, provide patient/caregiver education, reduce fall risk, and maximize (I) and safety with functional mobility.     Justification for Walker HME   The mobility limitation cannot be sufficiently resolved by the use of a cane.   Patient's functional mobility deficit can be sufficiently resolved with the use of a walker.  Patient's mobility limitation significantly impairs their ability to participate in one of more activities of daily living.  The use of a walker will significantly improve the patients ability to participate in MRADLS and the patient will use it on  regular basis in the home.       Rehab Prognosis: Good; patient would benefit from acute skilled PT services to address these deficits and reach maximum level of function.      Plan:     During this hospitalization, patient to be seen 3 x/week to address the identified rehab impairments via therapeutic activities, gait training, therapeutic exercises, neuromuscular re-education and progress toward the following goals:    Plan of Care Expires:  05/06/24    This plan of care has been discussed with the patient/caregiver, who was included in its development and is in agreement with the identified goals and treatment plan.     Subjective     Communicated with RN prior to session.  Patient agreeable to participate.     Chief Complaint: S/p L3-5 fusion  Patient/Family Comments/goals: None stated    Pain/Comfort:  Pain Rating 1: 7/10  Location - Orientation 1: generalized  Location 1: back  Pain Addressed 1: Reposition, Distraction  Pain Rating Post-Intervention 1: 8/10    Patients cultural, spiritual, Zoroastrianism conflicts given the current situation: no    Patient's living environment is as follows:  Living Environment: Pt lives with spouse in 2SH with 2 JOHN, no HR. Pt's bedroom and bathroom are downstairs. Bathroom set-up: walk-in shower  Prior Level of Function: independent with mobility and ADLs  DME used: none  DME owned (not currently used): none  Upon discharge, patient will have assistance from: Spouse    Objective:     Patient found HOB elevated with bed alarm, peripheral IV, hemovac  upon PT entry to room.    General Precautions: Standard, fall   Orthopedic Precautions:spinal precautions   Braces: LSO   BP (!) 143/84   Pulse (!) 116   Temp 99.3 °F (37.4 °C)   Resp 18   Ht 6' (1.829 m)   Wt (!) 137.8 kg (303 lb 12.7 oz)   SpO2 (!) 92%   BMI 41.20 kg/m²   Oxygen Device:  room air      Exams:    Cognition:  Patient is oriented to Person, Place, Time, Situation  Follows multistep  commands  Insight to  deficits/safety awareness: intact    Edema: None present    Postural examination/scapula alignment: Rounded shoulder    Lower Extremity Range of Motion:  Right Lower Extremity: WNL  Left Lower Extremity: WNL    Lower Extremity Strength    Right LE  Left LE    Hip Flexion: 4+/5 Hip Flexion: 4+/5   Knee Extension: 5/5 Knee Extension: 5/5   Knee Flexion: 5/5 Knee Flexion: 5/5   Ankle Dorsiflexion:  5/5 Ankle Dorsiflexion: 5/5   Ankle Plantarflexion: 5/5 Ankle Plantarflexion: 5/5        Sensation:   Light touch sensation: Intact BLEs    Functional Mobility:    Bed Mobility:  Supine to Sit: Stand-by Assistance on R side of bed  Via logroll  LSO donned in sitting  Scooting anteriorly to EOB to plant feet on floor: Stand-by Assistance    Transfers:   Sit to Stand Transfer:   Contact Guard Assistance  from EOB with HHAx2  Increased time  CGA from EOB w/ RW  PT provided cues for hand placement on AD  Bed to Chair: Activity did not occur - pt declined             Gait:  Patient received gait training 12 feet with Contact Guard Assistance and  no AD, then ~110' with SBA and RW  Gait Assessment: occasional unsteady gait, wide base of support, and decreased enoch  Gait Pattern Observed: Step-through reciprocal gait  Comments: All lines remained intact throughout ambulation trial, gait belt utilized. Without AD, pt attempts to use walls for balance assist. Stability greatly approved with RW. PT provided cues for proper mechanics and AD use throughout    Balance:  Static Sit:   Supervision at EOB   Static Stand:   Stand-By Assist with Rolling walker  Dynamic Stand:  Stand-By Assist with Rolling walker      Therapeutic Activities/Exercises     Patient assisted with functional mobility as noted above  Discussed at length benefits of PT as well as d/c recommendations. Pt agreeable  Therapist educated patient on spinal precautions: no bending, lifting, or twisting. Patient expressed understanding. Educated on proper donning/doffing  of LSO and the need to wear LSO when upright/OOB  Patient educated on the importance of early mobility, OOB to prevent functional decline during hospital stay  Patient was instructed to utilize staff assistance for mobility/transfers.  Patient is appropriate to transfer with SBA and RN/PCT assist. RW provided to room  Patient educated on PT POC and role of PT in acute care  White board updated to include patient's safest level of mobility with staff assistance, RN also updated    AM-PAC 6 CLICK MOBILITY  Turning over in bed (including adjusting bedclothes, sheets and blankets)?: 4  Sitting down on and standing up from a chair with arms (e.g., wheelchair, bedside commode, etc.): 3  Moving from lying on back to sitting on the side of the bed?: 4  Moving to and from a bed to a chair (including a wheelchair)?: 3  Need to walk in hospital room?: 3  Climbing 3-5 steps with a railing?: 3  Basic Mobility Total Score: 20       Patient left sitting edge of bed with all lines intact, call button in reach, and RN notified. Spouse present      History/Goals:     PAST MEDICAL HISTORY:  Past Medical History:   Diagnosis Date    Depression     Diabetes mellitus, type 2     Hyperlipidemia     Hypothyroidism     Pulmonary embolism        Past Surgical History:   Procedure Laterality Date    ANKLE SURGERY Bilateral     x 2- right;; 9 to left    BACK SURGERY      multiple    KNEE ARTHROSCOPY Left        GOALS:   Multidisciplinary Problems       Physical Therapy Goals          Problem: Physical Therapy    Goal Priority Disciplines Outcome Goal Variances Interventions   Physical Therapy Goal     PT, PT/OT Ongoing, Progressing     Description: Goals to be met by: 24     Patient will increase functional independence with mobility by performin. Supine to sit with Modified Williamson  2. Sit to supine with Modified Williamson  3. Sit to stand transfer with Modified Williamson  4. Bed to chair transfer with Modified  Elim using LRAD as needed  5. Gait  x 150 feet with Modified Elim using LRAD as needed.   6. Ascend/descend 2 stair with no Handrails with Supervision   7. Lower extremity exercise program x15 reps per handout, with assistance as needed                         Time Tracking:     PT Received On: 04/06/24  PT Start Time: 0938     PT Stop Time: 1011  PT Total Time (min): 33 min     Billable Minutes: Evaluation 10 and Gait Training 23      Blanca Watkins, PT  04/06/2024  Pager# [phone]

## 2024-04-07 LAB
OHS QRS DURATION: 82 MS
OHS QTC CALCULATION: 430 MS
POCT GLUCOSE: 147 MG/DL (ref 70–110)
POCT GLUCOSE: 152 MG/DL (ref 70–110)
POCT GLUCOSE: 161 MG/DL (ref 70–110)
POCT GLUCOSE: 164 MG/DL (ref 70–110)

## 2024-04-07 PROCEDURE — 63600175 PHARM REV CODE 636 W HCPCS: Performed by: PHYSICIAN ASSISTANT

## 2024-04-07 PROCEDURE — 63600175 PHARM REV CODE 636 W HCPCS

## 2024-04-07 PROCEDURE — 25000003 PHARM REV CODE 250: Performed by: PHYSICIAN ASSISTANT

## 2024-04-07 PROCEDURE — 11000001 HC ACUTE MED/SURG PRIVATE ROOM

## 2024-04-07 PROCEDURE — 25000003 PHARM REV CODE 250

## 2024-04-07 RX ORDER — HYDROMORPHONE HYDROCHLORIDE 1 MG/ML
1 INJECTION, SOLUTION INTRAMUSCULAR; INTRAVENOUS; SUBCUTANEOUS EVERY 6 HOURS PRN
Status: DISCONTINUED | OUTPATIENT
Start: 2024-04-07 | End: 2024-04-08

## 2024-04-07 RX ORDER — LABETALOL HCL 20 MG/4 ML
10 SYRINGE (ML) INTRAVENOUS EVERY 6 HOURS PRN
Status: DISCONTINUED | OUTPATIENT
Start: 2024-04-07 | End: 2024-04-09 | Stop reason: HOSPADM

## 2024-04-07 RX ADMIN — DIAZEPAM 5 MG: 5 TABLET ORAL at 11:04

## 2024-04-07 RX ADMIN — LEVOTHYROXINE SODIUM 100 MCG: 100 TABLET ORAL at 06:04

## 2024-04-07 RX ADMIN — OXYCODONE HYDROCHLORIDE 10 MG: 10 TABLET ORAL at 08:04

## 2024-04-07 RX ADMIN — METHOCARBAMOL 1000 MG: 500 TABLET ORAL at 08:04

## 2024-04-07 RX ADMIN — INSULIN ASPART 2 UNITS: 100 INJECTION, SOLUTION INTRAVENOUS; SUBCUTANEOUS at 09:04

## 2024-04-07 RX ADMIN — HYDROMORPHONE HYDROCHLORIDE 1 MG: 1 INJECTION, SOLUTION INTRAMUSCULAR; INTRAVENOUS; SUBCUTANEOUS at 09:04

## 2024-04-07 RX ADMIN — OXYCODONE HYDROCHLORIDE 10 MG: 10 TABLET ORAL at 04:04

## 2024-04-07 RX ADMIN — OXYCODONE HYDROCHLORIDE 10 MG: 10 TABLET ORAL at 12:04

## 2024-04-07 RX ADMIN — METHOCARBAMOL 1000 MG: 500 TABLET ORAL at 05:04

## 2024-04-07 RX ADMIN — ENOXAPARIN SODIUM 40 MG: 40 INJECTION SUBCUTANEOUS at 05:04

## 2024-04-07 RX ADMIN — OXYCODONE HYDROCHLORIDE 10 MG: 10 TABLET ORAL at 05:04

## 2024-04-07 RX ADMIN — ATORVASTATIN CALCIUM 10 MG: 10 TABLET, FILM COATED ORAL at 08:04

## 2024-04-07 RX ADMIN — HYDROMORPHONE HYDROCHLORIDE 1 MG: 1 INJECTION, SOLUTION INTRAMUSCULAR; INTRAVENOUS; SUBCUTANEOUS at 06:04

## 2024-04-07 RX ADMIN — CEFAZOLIN 2 G: 2 INJECTION, POWDER, FOR SOLUTION INTRAMUSCULAR; INTRAVENOUS at 07:04

## 2024-04-07 RX ADMIN — INSULIN ASPART 2 UNITS: 100 INJECTION, SOLUTION INTRAVENOUS; SUBCUTANEOUS at 06:04

## 2024-04-07 RX ADMIN — METHOCARBAMOL 1000 MG: 500 TABLET ORAL at 01:04

## 2024-04-07 RX ADMIN — DIAZEPAM 5 MG: 5 TABLET ORAL at 10:04

## 2024-04-07 RX ADMIN — CEFAZOLIN 2 G: 2 INJECTION, POWDER, FOR SOLUTION INTRAMUSCULAR; INTRAVENOUS at 11:04

## 2024-04-07 RX ADMIN — OXYCODONE HYDROCHLORIDE 10 MG: 10 TABLET ORAL at 01:04

## 2024-04-07 RX ADMIN — HYDROMORPHONE HYDROCHLORIDE 1 MG: 1 INJECTION, SOLUTION INTRAMUSCULAR; INTRAVENOUS; SUBCUTANEOUS at 02:04

## 2024-04-07 RX ADMIN — HYDROMORPHONE HYDROCHLORIDE 1 MG: 1 INJECTION, SOLUTION INTRAMUSCULAR; INTRAVENOUS; SUBCUTANEOUS at 11:04

## 2024-04-07 RX ADMIN — HYDROMORPHONE HYDROCHLORIDE 1 MG: 1 INJECTION, SOLUTION INTRAMUSCULAR; INTRAVENOUS; SUBCUTANEOUS at 03:04

## 2024-04-07 RX ADMIN — LIDOCAINE 5% 2 PATCH: 700 PATCH TOPICAL at 08:04

## 2024-04-07 RX ADMIN — CEFAZOLIN 2 G: 2 INJECTION, POWDER, FOR SOLUTION INTRAMUSCULAR; INTRAVENOUS at 02:04

## 2024-04-07 RX ADMIN — INSULIN ASPART 2 UNITS: 100 INJECTION, SOLUTION INTRAVENOUS; SUBCUTANEOUS at 11:04

## 2024-04-07 RX ADMIN — DIAZEPAM 5 MG: 5 TABLET ORAL at 04:04

## 2024-04-07 RX ADMIN — DOCUSATE SODIUM AND SENNOSIDES 2 TABLET: 8.6; 5 TABLET, FILM COATED ORAL at 08:04

## 2024-04-07 NOTE — ASSESSMENT & PLAN NOTE
53 M presents for elective L3-5 reivision fusion on 4/5/2024.     - admitted to floor postop  - q4 hour neurochecks  - post op xrays with hardware intact and in position  - HM consult if tachycardia again today  - drains x2, abx while in place  - Multimodal pain control, robaxin, lyrica, tyelnol, prn narcotics  - LSO when OOB  - PT/OT/OOB   - Diabetic diet  - accuchecks qid for DM  - SQH  - Ppx: BR, IS, SCDs, TEDs

## 2024-04-07 NOTE — SUBJECTIVE & OBJECTIVE
Interval History: 4/7: Tachy yesterday to 150s, workup negative. Denies chest pain / palpitations / SOB. Neruo exam stable. If continued issues today will consult medicine. Drains to remain today    Medications:  Continuous Infusions:  Scheduled Meds:   atorvastatin  10 mg Oral Daily    ceFAZolin (Ancef) IV (PEDS and ADULTS)  2 g Intravenous Q8H    enoxparin  40 mg Subcutaneous Daily    levothyroxine  100 mcg Oral Before breakfast    LIDOcaine  2 patch Transdermal Daily    methocarbamoL  1,000 mg Oral QID    senna-docusate 8.6-50 mg  2 tablet Oral Daily    sodium chloride 0.9%  500 mL Intravenous Once     PRN Meds:acetaminophen, aluminum-magnesium hydroxide-simethicone, dextrose 10%, dextrose 10%, diazePAM, glucagon (human recombinant), glucose, glucose, HYDROmorphone, insulin aspart U-100, labetalol, ondansetron, oxyCODONE, oxyCODONE, prochlorperazine     Review of Systems  Objective:     Weight: (!) 137.8 kg (303 lb 12.7 oz)  Body mass index is 41.2 kg/m².  Vital Signs (Most Recent):  Temp: 98.3 °F (36.8 °C) (04/07/24 0722)  Pulse: (!) 113 (04/07/24 0722)  Resp: 18 (04/07/24 0722)  BP: 134/84 (04/07/24 0722)  SpO2: 95 % (04/07/24 0722) Vital Signs (24h Range):  Temp:  [98.2 °F (36.8 °C)-101 °F (38.3 °C)] 98.3 °F (36.8 °C)  Pulse:  [103-145] 113  Resp:  [17-19] 18  SpO2:  [93 %-96 %] 95 %  BP: (131-143)/(73-88) 134/84                                Closed/Suction Drain 04/05/24 1406 Inferior;Medial;Posterior;Right Back Accordion 10 Fr. (Active)   Site Description Healing 04/06/24 0800   Dressing Type Other (Comment) 04/06/24 0800   Dressing Status Other (Comment) 04/06/24 0800   Dressing Intervention Other (Comment) 04/06/24 0800   Drainage Bloody 04/06/24 0800   Status Other (Comment) 04/06/24 0800   Output (mL) 0 mL 04/06/24 0555            Closed/Suction Drain 04/05/24 1408 Left;Posterior;Inferior;Medial Back Accordion 10 Fr. (Active)   Site Description Healing 04/06/24 0800   Dressing Type Other (Comment)  "04/06/24 0800   Dressing Status Other (Comment) 04/06/24 0800   Dressing Intervention Other (Comment) 04/06/24 0800   Drainage Bloody 04/06/24 0800   Status Other (Comment) 04/06/24 0800   Output (mL) 210 mL 04/06/24 0555          Physical Exam         Neurosurgery Physical Exam    Physical Exam:    Constitutional: No distress.     HEENT: atraumatic/normocephalic    Cardiovascular: Regular rhythm.     Pulm: aerating well, saturating well    Abdominal: Soft.     Psych/Behavior: He is alert.     RUE: 5/5 delt, 5/5 bi, 5/5 tri, 5/5 hg, 5/5 io  LUE: 5/5 delt, 5/5 bi, 5/5 tri, 5/5 hg, 5/5 io  RLE: 5/5 hf, 5/5 quad, 5/5 hamstring, 5/5 df, 5/5 pf  LLE: 5/5 hf, 5/5 quad, 5/5 hamstring, 5/5 df, 5/5 pf    SILT    No hoffmans  No clonus  No babinski      Significant Labs:  Recent Labs   Lab 04/05/24  1808 04/06/24  1814   * 180*   * 129*   K 3.8 3.8    101   CO2 20* 18*   BUN 10 9   CREATININE 0.9 0.9   CALCIUM 9.1 9.1       Recent Labs   Lab 04/05/24  1808 04/06/24  1814   WBC 19.54* 27.14*   HGB 14.7 14.6   HCT 41.5 42.9    279       No results for input(s): "LABPT", "INR", "APTT" in the last 48 hours.  Microbiology Results (last 7 days)       ** No results found for the last 168 hours. **          All pertinent labs from the last 24 hours have been reviewed.    Significant Diagnostics:  I have reviewed and interpreted all pertinent imaging results/findings within the past 24 hours.  X-Ray Lumbar Spine Ap And Lateral    Result Date: 4/6/2024  As above. Electronically signed by: Dakotah Isbell MD Date:    04/06/2024 Time:    11:59     "

## 2024-04-07 NOTE — PROGRESS NOTES
[facility] - Neurosurgery ([facility])  Neurosurgery  Progress Note    Subjective:     History of Present Illness: 53 M presents for elective L3-5 reivision fusion on 4/5/2024.     Post-Op Info:  Procedure(s) (LRB):  ROBOTIC REVISION L3-S1 FUSION (N/A)   2 Days Post-Op   Interval History: 4/7: Tachy yesterday to 150s, workup negative. Denies chest pain / palpitations / SOB. Neruo exam stable. If continued issues today will consult medicine. Drains to remain today    Medications:  Continuous Infusions:  Scheduled Meds:   atorvastatin  10 mg Oral Daily    ceFAZolin (Ancef) IV (PEDS and ADULTS)  2 g Intravenous Q8H    enoxparin  40 mg Subcutaneous Daily    levothyroxine  100 mcg Oral Before breakfast    LIDOcaine  2 patch Transdermal Daily    methocarbamoL  1,000 mg Oral QID    senna-docusate 8.6-50 mg  2 tablet Oral Daily    sodium chloride 0.9%  500 mL Intravenous Once     PRN Meds:acetaminophen, aluminum-magnesium hydroxide-simethicone, dextrose 10%, dextrose 10%, diazePAM, glucagon (human recombinant), glucose, glucose, HYDROmorphone, insulin aspart U-100, labetalol, ondansetron, oxyCODONE, oxyCODONE, prochlorperazine     Review of Systems  Objective:     Weight: (!) 137.8 kg (303 lb 12.7 oz)  Body mass index is 41.2 kg/m².  Vital Signs (Most Recent):  Temp: 98.3 °F (36.8 °C) (04/07/24 0722)  Pulse: (!) 113 (04/07/24 0722)  Resp: 18 (04/07/24 0722)  BP: 134/84 (04/07/24 0722)  SpO2: 95 % (04/07/24 0722) Vital Signs (24h Range):  Temp:  [98.2 °F (36.8 °C)-101 °F (38.3 °C)] 98.3 °F (36.8 °C)  Pulse:  [103-145] 113  Resp:  [17-19] 18  SpO2:  [93 %-96 %] 95 %  BP: (131-143)/(73-88) 134/84                                Closed/Suction Drain 04/05/24 1406 Inferior;Medial;Posterior;Right Back Accordion 10 Fr. (Active)   Site Description Healing 04/06/24 0800   Dressing Type Other (Comment) 04/06/24 0800   Dressing Status Other (Comment) 04/06/24 0800   Dressing Intervention Other (Comment) 04/06/24 0800   Drainage Bloody  "04/06/24 0800   Status Other (Comment) 04/06/24 0800   Output (mL) 0 mL 04/06/24 0555            Closed/Suction Drain 04/05/24 1408 Left;Posterior;Inferior;Medial Back Accordion 10 Fr. (Active)   Site Description Healing 04/06/24 0800   Dressing Type Other (Comment) 04/06/24 0800   Dressing Status Other (Comment) 04/06/24 0800   Dressing Intervention Other (Comment) 04/06/24 0800   Drainage Bloody 04/06/24 0800   Status Other (Comment) 04/06/24 0800   Output (mL) 210 mL 04/06/24 0555         Physical Exam         Neurosurgery Physical Exam    Physical Exam:    Constitutional: No distress.     HEENT: atraumatic/normocephalic    Cardiovascular: Regular rhythm.     Pulm: aerating well, saturating well    Abdominal: Soft.     Psych/Behavior: He is alert.     RUE: 5/5 delt, 5/5 bi, 5/5 tri, 5/5 hg, 5/5 io  LUE: 5/5 delt, 5/5 bi, 5/5 tri, 5/5 hg, 5/5 io  RLE: 5/5 hf, 5/5 quad, 5/5 hamstring, 5/5 df, 5/5 pf  LLE: 5/5 hf, 5/5 quad, 5/5 hamstring, 5/5 df, 5/5 pf    SILT    No hoffmans  No clonus  No babinski      Significant Labs:  Recent Labs   Lab 04/05/24  1808 04/06/24  1814   * 180*   * 129*   K 3.8 3.8    101   CO2 20* 18*   BUN 10 9   CREATININE 0.9 0.9   CALCIUM 9.1 9.1       Recent Labs   Lab 04/05/24  1808 04/06/24  1814   WBC 19.54* 27.14*   HGB 14.7 14.6   HCT 41.5 42.9    279       No results for input(s): "LABPT", "INR", "APTT" in the last 48 hours.  Microbiology Results (last 7 days)       ** No results found for the last 168 hours. **          All pertinent labs from the last 24 hours have been reviewed.    Significant Diagnostics:  I have reviewed and interpreted all pertinent imaging results/findings within the past 24 hours.  X-Ray Lumbar Spine Ap And Lateral    Result Date: 4/6/2024  As above. Electronically signed by: Dakotah Isblel MD Date:    04/06/2024 Time:    11:59     Assessment/Plan:     Pseudarthrosis after fusion or arthrodesis  53 M presents for elective L3-5 " reivision fusion on 4/5/2024.     - admitted to floor postop  - q4 hour neurochecks  - post op xrays with hardware intact and in position  - HM consult if tachycardia again today  - drains x2, abx while in place  - Multimodal pain control, robaxin, lyrica, tyelnol, prn narcotics  - LSO when OOB  - PT/OT/OOB   - Diabetic diet  - accuchecks qid for DM  - SQH  - Ppx: BR, IS, SCDs, TEDs        Alonso Aguila MD  Neurosurgery  [facility] - Neurosurgery ([facility])

## 2024-04-07 NOTE — PLAN OF CARE
Problem: Adult Inpatient Plan of Care  Goal: Patient-Specific Goal (Individualized)  Outcome: Ongoing, Progressing     Problem: Pain Acute  Goal: Acceptable Pain Control and Functional Ability  Outcome: Ongoing, Progressing     Problem: Diabetes Comorbidity  Goal: Blood Glucose Level Within Targeted Range  Outcome: Ongoing, Progressing     Problem: Infection  Goal: Absence of Infection Signs and Symptoms  Outcome: Ongoing, Progressing     Problem: Fall Injury Risk  Goal: Absence of Fall and Fall-Related Injury  Outcome: Ongoing, Progressing       POC updated and reviewed with the patient/spouse at the bedside. Questions regarding POC were encouraged and addressed. VSS, see flowsheets. Patient is AOX 4 at this time. Tele maintained per order.  Fall and safety precautions maintained, no signs of injury noted during shift. Patient repositioned independently for comfort with bed locked in low position, side rails up x 2, bed alarm set, with call light within reach. Instructed patient to call staff for mobility, verbalized understanding. No acute signs of distress noted at this time.

## 2024-04-08 LAB
ALBUMIN SERPL BCP-MCNC: 2.6 G/DL (ref 3.5–5.2)
ALP SERPL-CCNC: 51 U/L (ref 55–135)
ALT SERPL W/O P-5'-P-CCNC: 13 U/L (ref 10–44)
ANION GAP SERPL CALC-SCNC: 9 MMOL/L (ref 8–16)
AST SERPL-CCNC: 14 U/L (ref 10–40)
BASOPHILS # BLD AUTO: 0.09 K/UL (ref 0–0.2)
BASOPHILS NFR BLD: 0.4 % (ref 0–1.9)
BILIRUB SERPL-MCNC: 1.1 MG/DL (ref 0.1–1)
BUN SERPL-MCNC: 11 MG/DL (ref 6–20)
CALCIUM SERPL-MCNC: 9 MG/DL (ref 8.7–10.5)
CHLORIDE SERPL-SCNC: 101 MMOL/L (ref 95–110)
CO2 SERPL-SCNC: 22 MMOL/L (ref 23–29)
CREAT SERPL-MCNC: 0.8 MG/DL (ref 0.5–1.4)
DIFFERENTIAL METHOD BLD: ABNORMAL
EOSINOPHIL # BLD AUTO: 0.1 K/UL (ref 0–0.5)
EOSINOPHIL NFR BLD: 0.4 % (ref 0–8)
ERYTHROCYTE [DISTWIDTH] IN BLOOD BY AUTOMATED COUNT: 13.1 % (ref 11.5–14.5)
EST. GFR  (NO RACE VARIABLE): >60 ML/MIN/1.73 M^2
GLUCOSE SERPL-MCNC: 147 MG/DL (ref 70–110)
HCT VFR BLD AUTO: 39.4 % (ref 40–54)
HGB BLD-MCNC: 13.6 G/DL (ref 14–18)
IMM GRANULOCYTES # BLD AUTO: 0.1 K/UL (ref 0–0.04)
IMM GRANULOCYTES NFR BLD AUTO: 0.5 % (ref 0–0.5)
LYMPHOCYTES # BLD AUTO: 1.9 K/UL (ref 1–4.8)
LYMPHOCYTES NFR BLD: 9.1 % (ref 18–48)
MCH RBC QN AUTO: 32.3 PG (ref 27–31)
MCHC RBC AUTO-ENTMCNC: 34.5 G/DL (ref 32–36)
MCV RBC AUTO: 94 FL (ref 82–98)
MONOCYTES # BLD AUTO: 1.7 K/UL (ref 0.3–1)
MONOCYTES NFR BLD: 8 % (ref 4–15)
NEUTROPHILS # BLD AUTO: 17.1 K/UL (ref 1.8–7.7)
NEUTROPHILS NFR BLD: 81.6 % (ref 38–73)
NRBC BLD-RTO: 0 /100 WBC
PLATELET # BLD AUTO: 245 K/UL (ref 150–450)
PMV BLD AUTO: 9.7 FL (ref 9.2–12.9)
POCT GLUCOSE: 137 MG/DL (ref 70–110)
POCT GLUCOSE: 139 MG/DL (ref 70–110)
POCT GLUCOSE: 144 MG/DL (ref 70–110)
POCT GLUCOSE: 200 MG/DL (ref 70–110)
POTASSIUM SERPL-SCNC: 3.8 MMOL/L (ref 3.5–5.1)
PROT SERPL-MCNC: 7 G/DL (ref 6–8.4)
RBC # BLD AUTO: 4.21 M/UL (ref 4.6–6.2)
SODIUM SERPL-SCNC: 132 MMOL/L (ref 136–145)
WBC # BLD AUTO: 20.9 K/UL (ref 3.9–12.7)

## 2024-04-08 PROCEDURE — 25000003 PHARM REV CODE 250

## 2024-04-08 PROCEDURE — 63600175 PHARM REV CODE 636 W HCPCS: Performed by: PHYSICIAN ASSISTANT

## 2024-04-08 PROCEDURE — 11000001 HC ACUTE MED/SURG PRIVATE ROOM

## 2024-04-08 PROCEDURE — 80053 COMPREHEN METABOLIC PANEL: CPT

## 2024-04-08 PROCEDURE — 25000003 PHARM REV CODE 250: Performed by: PHYSICIAN ASSISTANT

## 2024-04-08 PROCEDURE — 97116 GAIT TRAINING THERAPY: CPT

## 2024-04-08 PROCEDURE — 36415 COLL VENOUS BLD VENIPUNCTURE: CPT

## 2024-04-08 PROCEDURE — 63600175 PHARM REV CODE 636 W HCPCS

## 2024-04-08 PROCEDURE — 85025 COMPLETE CBC W/AUTO DIFF WBC: CPT

## 2024-04-08 PROCEDURE — 99024 POSTOP FOLLOW-UP VISIT: CPT | Mod: ,,,

## 2024-04-08 PROCEDURE — 97530 THERAPEUTIC ACTIVITIES: CPT

## 2024-04-08 RX ORDER — POLYETHYLENE GLYCOL 3350 17 G/17G
17 POWDER, FOR SOLUTION ORAL DAILY
Status: DISCONTINUED | OUTPATIENT
Start: 2024-04-08 | End: 2024-04-09 | Stop reason: HOSPADM

## 2024-04-08 RX ORDER — METHOCARBAMOL 750 MG/1
750 TABLET, FILM COATED ORAL EVERY 8 HOURS PRN
Status: DISCONTINUED | OUTPATIENT
Start: 2024-04-08 | End: 2024-04-09 | Stop reason: HOSPADM

## 2024-04-08 RX ORDER — METHOCARBAMOL 500 MG/1
1000 TABLET, FILM COATED ORAL 3 TIMES DAILY
Qty: 60 TABLET | Refills: 0 | Status: SHIPPED | OUTPATIENT
Start: 2024-04-08 | End: 2024-04-19

## 2024-04-08 RX ORDER — OXYCODONE HYDROCHLORIDE 10 MG/1
10 TABLET ORAL EVERY 6 HOURS PRN
Qty: 30 TABLET | Refills: 0 | Status: SHIPPED | OUTPATIENT
Start: 2024-04-08

## 2024-04-08 RX ORDER — ACETAMINOPHEN 500 MG
1000 TABLET ORAL EVERY 8 HOURS
Status: DISCONTINUED | OUTPATIENT
Start: 2024-04-08 | End: 2024-04-09 | Stop reason: HOSPADM

## 2024-04-08 RX ORDER — DIAZEPAM 5 MG/1
5 TABLET ORAL EVERY 6 HOURS
Status: DISCONTINUED | OUTPATIENT
Start: 2024-04-08 | End: 2024-04-09 | Stop reason: HOSPADM

## 2024-04-08 RX ORDER — BISACODYL 10 MG/1
10 SUPPOSITORY RECTAL DAILY
Status: DISCONTINUED | OUTPATIENT
Start: 2024-04-08 | End: 2024-04-09 | Stop reason: HOSPADM

## 2024-04-08 RX ADMIN — OXYCODONE HYDROCHLORIDE 10 MG: 10 TABLET ORAL at 02:04

## 2024-04-08 RX ADMIN — DOCUSATE SODIUM AND SENNOSIDES 2 TABLET: 8.6; 5 TABLET, FILM COATED ORAL at 08:04

## 2024-04-08 RX ADMIN — HYDROMORPHONE HYDROCHLORIDE 1 MG: 1 INJECTION, SOLUTION INTRAMUSCULAR; INTRAVENOUS; SUBCUTANEOUS at 03:04

## 2024-04-08 RX ADMIN — OXYCODONE HYDROCHLORIDE 10 MG: 10 TABLET ORAL at 10:04

## 2024-04-08 RX ADMIN — ACETAMINOPHEN 1000 MG: 500 TABLET ORAL at 02:04

## 2024-04-08 RX ADMIN — OXYCODONE HYDROCHLORIDE 10 MG: 10 TABLET ORAL at 12:04

## 2024-04-08 RX ADMIN — METHOCARBAMOL 750 MG: 750 TABLET ORAL at 09:04

## 2024-04-08 RX ADMIN — OXYCODONE HYDROCHLORIDE 10 MG: 10 TABLET ORAL at 05:04

## 2024-04-08 RX ADMIN — ACETAMINOPHEN 1000 MG: 500 TABLET ORAL at 09:04

## 2024-04-08 RX ADMIN — ENOXAPARIN SODIUM 40 MG: 40 INJECTION SUBCUTANEOUS at 05:04

## 2024-04-08 RX ADMIN — BISACODYL 10 MG: 10 SUPPOSITORY RECTAL at 10:04

## 2024-04-08 RX ADMIN — DIAZEPAM 5 MG: 5 TABLET ORAL at 05:04

## 2024-04-08 RX ADMIN — DIAZEPAM 5 MG: 5 TABLET ORAL at 03:04

## 2024-04-08 RX ADMIN — POLYETHYLENE GLYCOL 3350 17 G: 17 POWDER, FOR SOLUTION ORAL at 10:04

## 2024-04-08 RX ADMIN — LIDOCAINE 5% 2 PATCH: 700 PATCH TOPICAL at 08:04

## 2024-04-08 RX ADMIN — METHOCARBAMOL 750 MG: 750 TABLET ORAL at 12:04

## 2024-04-08 RX ADMIN — Medication 1 ENEMA: at 01:04

## 2024-04-08 RX ADMIN — LEVOTHYROXINE SODIUM 100 MCG: 100 TABLET ORAL at 06:04

## 2024-04-08 RX ADMIN — DIAZEPAM 5 MG: 5 TABLET ORAL at 12:04

## 2024-04-08 RX ADMIN — OXYCODONE HYDROCHLORIDE 10 MG: 10 TABLET ORAL at 09:04

## 2024-04-08 RX ADMIN — OXYCODONE HYDROCHLORIDE 10 MG: 10 TABLET ORAL at 06:04

## 2024-04-08 RX ADMIN — DIAZEPAM 5 MG: 5 TABLET ORAL at 11:04

## 2024-04-08 RX ADMIN — CEFAZOLIN 2 G: 2 INJECTION, POWDER, FOR SOLUTION INTRAMUSCULAR; INTRAVENOUS at 03:04

## 2024-04-08 RX ADMIN — ATORVASTATIN CALCIUM 10 MG: 10 TABLET, FILM COATED ORAL at 08:04

## 2024-04-08 RX ADMIN — INSULIN ASPART 2 UNITS: 100 INJECTION, SOLUTION INTRAVENOUS; SUBCUTANEOUS at 12:04

## 2024-04-08 NOTE — SUBJECTIVE & OBJECTIVE
Interval History: NAEON. Vitals WNL today. C/o of significant L knee pain. Has a h/o of L knee pain a surgery on it years ago. States it often gives him trouble. Also states his back pain is only controlled with dilaudid. Dc'ed dilaudid today and adjusted his oral pain medications in order to likely discharge patient home. Ambulating ad silvia. Drains with deep 5 cc and superficial 70 cc output. Will remove if dc today. No BM yet, adding miralax.    Medications:  Continuous Infusions:  Scheduled Meds:   acetaminophen  1,000 mg Oral Q8H    atorvastatin  10 mg Oral Daily    ceFAZolin (Ancef) IV (PEDS and ADULTS)  2 g Intravenous Q8H    diazePAM  5 mg Oral Q6H    enoxparin  40 mg Subcutaneous Daily    levothyroxine  100 mcg Oral Before breakfast    LIDOcaine  2 patch Transdermal Daily    polyethylene glycol  17 g Oral Daily    senna-docusate 8.6-50 mg  2 tablet Oral Daily    sodium chloride 0.9%  500 mL Intravenous Once     PRN Meds:aluminum-magnesium hydroxide-simethicone, dextrose 10%, dextrose 10%, glucagon (human recombinant), glucose, glucose, insulin aspart U-100, labetalol, methocarbamoL, ondansetron, oxyCODONE, oxyCODONE, prochlorperazine     Review of Systems  Objective:     Weight: (!) 137.8 kg (303 lb 12.7 oz)  Body mass index is 41.2 kg/m².  Vital Signs (Most Recent):  Temp: 99.1 °F (37.3 °C) (04/08/24 0709)  Pulse: 106 (04/08/24 0709)  Resp: 18 (04/08/24 0709)  BP: 134/85 (04/08/24 0709)  SpO2: (!) 94 % (04/08/24 0709) Vital Signs (24h Range):  Temp:  [99.1 °F (37.3 °C)-99.7 °F (37.6 °C)] 99.1 °F (37.3 °C)  Pulse:  [105-144] 106  Resp:  [16-18] 18  SpO2:  [92 %-95 %] 94 %  BP: (126-161)/(70-89) 134/85     Date 04/08/24 0700 - 04/09/24 0659   Shift 8183-1931 6785-0520 9001-7115 24 Hour Total   INTAKE   Shift Total(mL/kg)       OUTPUT   Drains 74   74   Shift Total(mL/kg) 74(0.5)   74(0.5)   Weight (kg) 137.8 137.8 137.8 137.8                            Closed/Suction Drain 04/05/24 1406  "Inferior;Medial;Posterior;Right Back Accordion 10 Fr. (Active)   Site Description Healing 04/07/24 1929   Dressing Type Other (Comment) 04/06/24 0800   Dressing Status Other (Comment) 04/06/24 0800   Dressing Intervention Other (Comment) 04/07/24 0350   Drainage Serosanguineous 04/07/24 1929   Status To bulb suction 04/07/24 1929   Output (mL) 70 mL 04/08/24 0758            Closed/Suction Drain 04/05/24 1408 Left;Posterior;Inferior;Medial Back Accordion 10 Fr. (Active)   Site Description Healing 04/07/24 1929   Dressing Type Other (Comment) 04/06/24 0800   Dressing Status Other (Comment) 04/06/24 0800   Dressing Intervention Other (Comment) 04/07/24 0350   Drainage Serosanguineous 04/07/24 1929   Status To bulb suction 04/07/24 1929   Output (mL) 4 mL 04/08/24 0758       Neurosurgery Physical Exam  Constitutional: No distress.      HEENT: atraumatic/normocephalic     Cardiovascular: Regular rhythm.      Pulm: aerating well, saturating well     Abdominal: Soft.      Psych/Behavior: He is alert.      RUE: 5/5 delt, 5/5 bi, 5/5 tri, 5/5 hg, 5/5 io  LUE: 5/5 delt, 5/5 bi, 5/5 tri, 5/5 hg, 5/5 io  LLE: 3/5 hf, 3/5 quad, 3/5 hamstring, 5/5 df, 5/5 pf (strength significantly limited proximally due to pain with ROM in the knee)  RLE: 5/5 hf, 5/5 quad, 5/5 hamstring, 5/5 df, 5/5 pf     SILT     No hoffmans  No clonus  No babinski    Significant Labs:  Recent Labs   Lab 04/06/24 1814 04/08/24  0752   * 147*   * 132*   K 3.8 3.8    101   CO2 18* 22*   BUN 9 11   CREATININE 0.9 0.8   CALCIUM 9.1 9.0     Recent Labs   Lab 04/06/24  1814 04/08/24  0753   WBC 27.14* 20.90*   HGB 14.6 13.6*   HCT 42.9 39.4*    245     No results for input(s): "LABPT", "INR", "APTT" in the last 48 hours.  Microbiology Results (last 7 days)       ** No results found for the last 168 hours. **          All pertinent labs from the last 24 hours have been reviewed.    Significant Diagnostics:  I have reviewed and interpreted " all pertinent imaging results/findings within the past 24 hours.

## 2024-04-08 NOTE — PLAN OF CARE
[facility] - Neurosurgery (Hospital)  Discharge Reassessment    Primary Care Provider: Mateo Lyman MD    Expected Discharge Date: 4/9/2024    Patient is not medically ready for discharge.  Patient with drain in place.  CM noted low intensity therapy level.  CM completed [facility] paperwork for rolling walker and home health.  Paperwork forwarded to PA on service today to get signed by MD.  Once paperwork is signed, CM will forward to Mon[facility] with [facility].    Discharge Plan A and Plan B have been determined by review of patient's clinical status, future medical and therapeutic needs, and coverage/benefits for post-acute care in coordination with multidisciplinary team members.     Reassessment (most recent)       Discharge Reassessment - 04/08/24 1656          Discharge Reassessment    Assessment Type Discharge Planning Reassessment     Did the patient's condition or plan change since previous assessment? No     Discharge Plan discussed with: Patient     Communicated MARYA with patient/caregiver Yes     Discharge Plan A Home with family;Home Health     Discharge Plan B Home with family     DME Needed Upon Discharge  walker, rolling     Transition of Care Barriers None     Why the patient remains in the hospital Requires continued medical care        Post-Acute Status    Discharge Delays None known at this time

## 2024-04-08 NOTE — PLAN OF CARE
Problem: Adult Inpatient Plan of Care  Goal: Plan of Care Review  Flowsheets (Taken 4/8/2024 0540)  Plan of Care Reviewed With: patient  Goal: Absence of Hospital-Acquired Illness or Injury  Intervention: Identify and Manage Fall Risk  Flowsheets (Taken 4/8/2024 0540)  Safety Promotion/Fall Prevention:   assistive device/personal item within reach   bed alarm set   Fall Risk reviewed with patient/family   Fall Risk signage in place   room near unit station   side rails raised x 2   supervised activity   instructed to call staff for mobility  Intervention: Prevent Skin Injury  Flowsheets (Taken 4/8/2024 0540)  Body Position: position changed independently  Intervention: Prevent and Manage VTE (Venous Thromboembolism) Risk  Flowsheets (Taken 4/8/2024 0540)  Activity Management: Ambulated in rivera - L4  VTE Prevention/Management:   ambulation promoted   bleeding risk assessed   fluids promoted   ROM (active) performed  Range of Motion: ROM (range of motion) performed  Intervention: Prevent Infection  Flowsheets (Taken 4/8/2024 0540)  Infection Prevention:   hand hygiene promoted   equipment surfaces disinfected   single patient room provided   rest/sleep promoted     Problem: Diabetes Comorbidity  Goal: Blood Glucose Level Within Targeted Range  Intervention: Monitor and Manage Glycemia  Flowsheets (Taken 4/8/2024 0540)  Glycemic Management: blood glucose monitored     Problem: Infection  Goal: Absence of Infection Signs and Symptoms  Intervention: Prevent or Manage Infection  Flowsheets (Taken 4/8/2024 0540)  Infection Management: (IV abx given per MAR) other (see comments)     Problem: Pain Acute  Goal: Acceptable Pain Control and Functional Ability  Intervention: Develop Pain Management Plan  Flowsheets (Taken 4/8/2024 0540)  Pain Management Interventions:   around-the-clock dosing utilized   care clustered   pain management plan reviewed with patient/caregiver   pillow support provided   quiet environment  facilitated   position adjusted

## 2024-04-08 NOTE — PROGRESS NOTES
[facility] - Neurosurgery ([facility])  Neurosurgery  Progress Note    Subjective:     History of Present Illness: 53 M presents for elective L3-5 reivision fusion on 4/5/2024.     Post-Op Info:  Procedure(s) (LRB):  ROBOTIC REVISION L3-S1 FUSION (N/A)   3 Days Post-Op   Interval History: NAEON. Vitals WNL today. C/o of significant L knee pain. Has a h/o of L knee pain a surgery on it years ago. States it often gives him trouble. Also states his back pain is only controlled with dilaudid. Dc'ed dilaudid today and adjusted his oral pain medications in order to likely discharge patient home. Ambulating ad silvia. Drains with deep 5 cc and superficial 70 cc output. Will remove if dc today. No BM yet, adding miralax.    Medications:  Continuous Infusions:  Scheduled Meds:   acetaminophen  1,000 mg Oral Q8H    atorvastatin  10 mg Oral Daily    ceFAZolin (Ancef) IV (PEDS and ADULTS)  2 g Intravenous Q8H    diazePAM  5 mg Oral Q6H    enoxparin  40 mg Subcutaneous Daily    levothyroxine  100 mcg Oral Before breakfast    LIDOcaine  2 patch Transdermal Daily    polyethylene glycol  17 g Oral Daily    senna-docusate 8.6-50 mg  2 tablet Oral Daily    sodium chloride 0.9%  500 mL Intravenous Once     PRN Meds:aluminum-magnesium hydroxide-simethicone, dextrose 10%, dextrose 10%, glucagon (human recombinant), glucose, glucose, insulin aspart U-100, labetalol, methocarbamoL, ondansetron, oxyCODONE, oxyCODONE, prochlorperazine     Review of Systems  Objective:     Weight: (!) 137.8 kg (303 lb 12.7 oz)  Body mass index is 41.2 kg/m².  Vital Signs (Most Recent):  Temp: 99.1 °F (37.3 °C) (04/08/24 0709)  Pulse: 106 (04/08/24 0709)  Resp: 18 (04/08/24 0709)  BP: 134/85 (04/08/24 0709)  SpO2: (!) 94 % (04/08/24 0709) Vital Signs (24h Range):  Temp:  [99.1 °F (37.3 °C)-99.7 °F (37.6 °C)] 99.1 °F (37.3 °C)  Pulse:  [105-144] 106  Resp:  [16-18] 18  SpO2:  [92 %-95 %] 94 %  BP: (126-161)/(70-89) 134/85     Date 04/08/24 0700 - 04/09/24 0659  "  Shift [id number] 8897-0495 8652-0659 24 Hour Total   INTAKE   Shift Total(mL/kg)       OUTPUT   Drains 74   74   Shift Total(mL/kg) 74(0.5)   74(0.5)   Weight (kg) 137.8 137.8 137.8 137.8                            Closed/Suction Drain 04/05/24 1406 Inferior;Medial;Posterior;Right Back Accordion 10 Fr. (Active)   Site Description Healing 04/07/24 1929   Dressing Type Other (Comment) 04/06/24 0800   Dressing Status Other (Comment) 04/06/24 0800   Dressing Intervention Other (Comment) 04/07/24 0350   Drainage Serosanguineous 04/07/24 1929   Status To bulb suction 04/07/24 1929   Output (mL) 70 mL 04/08/24 0758            Closed/Suction Drain 04/05/24 1408 Left;Posterior;Inferior;Medial Back Accordion 10 Fr. (Active)   Site Description Healing 04/07/24 1929   Dressing Type Other (Comment) 04/06/24 0800   Dressing Status Other (Comment) 04/06/24 0800   Dressing Intervention Other (Comment) 04/07/24 0350   Drainage Serosanguineous 04/07/24 1929   Status To bulb suction 04/07/24 1929   Output (mL) 4 mL 04/08/24 0758       Neurosurgery Physical Exam  Constitutional: No distress.      HEENT: atraumatic/normocephalic     Cardiovascular: Regular rhythm.      Pulm: aerating well, saturating well     Abdominal: Soft.      Psych/Behavior: He is alert.      RUE: 5/5 delt, 5/5 bi, 5/5 tri, 5/5 hg, 5/5 io  LUE: 5/5 delt, 5/5 bi, 5/5 tri, 5/5 hg, 5/5 io  LLE: 3/5 hf, 3/5 quad, 3/5 hamstring, 5/5 df, 5/5 pf (strength significantly limited proximally due to pain with ROM in the knee)  RLE: 5/5 hf, 5/5 quad, 5/5 hamstring, 5/5 df, 5/5 pf     SILT     No hoffmans  No clonus  No babinski    Significant Labs:  Recent Labs   Lab 04/06/24  1814 04/08/24  0752   * 147*   * 132*   K 3.8 3.8    101   CO2 18* 22*   BUN 9 11   CREATININE 0.9 0.8   CALCIUM 9.1 9.0     Recent Labs   Lab 04/06/24  1814 04/08/24  0753   WBC 27.14* 20.90*   HGB 14.6 13.6*   HCT 42.9 39.4*    245     No results for input(s): "LABPT", "INR", " ""APTT" in the last 48 hours.  Microbiology Results (last 7 days)       ** No results found for the last 168 hours. **          All pertinent labs from the last 24 hours have been reviewed.    Significant Diagnostics:  I have reviewed and interpreted all pertinent imaging results/findings within the past 24 hours.  Assessment/Plan:     Pseudarthrosis after fusion or arthrodesis  53 M presents for elective L3-5 reivision fusion on 4/5/2024.     - Admitted to floor postop.  - q4 hour neurochecks.  - Post op xrays with hardware intact and in position.  - drains x 2, abx while in place.  - Multimodal pain control. IV Dilaudid dc'ed today, adjusted oral medications.  - LSO when OOB.  - PT/OT/OOB.  - Diabetic diet.  - accuchecks qid for DM.  - Ppx: BR, IS, SCDs, TEDs, LVX..    Dispo: Likely home today with HH pending pain control and drain removal.        Naz Huffman PA-C  Neurosurgery  [facility] - Neurosurgery ([facility])  "

## 2024-04-08 NOTE — ASSESSMENT & PLAN NOTE
53 M presents for elective L3-5 reivision fusion on 4/5/2024.     - Admitted to floor postop.  - q4 hour neurochecks.  - Post op xrays with hardware intact and in position.  - drains x 2, abx while in place.  - Multimodal pain control. IV Dilaudid dc'ed today, adjusted oral medications.  - LSO when OOB.  - PT/OT/OOB.  - Diabetic diet.  - accuchecks qid for DM.  - SQH.  - Ppx: BR, IS, SCDs, TEDs.    Dispo: Likely home today with HH pending pain control and drain removal.

## 2024-04-08 NOTE — ANESTHESIA POSTPROCEDURE EVALUATION
Anesthesia Post Evaluation    Patient: Dexter Ward    Procedure(s) Performed: Procedure(s) (LRB):  ROBOTIC REVISION L3-S1 FUSION (N/A)    Final Anesthesia Type: general      Patient location during evaluation: PACU  Patient participation: Yes- Able to Participate  Level of consciousness: awake and alert  Post-procedure vital signs: reviewed and stable  Pain management: adequate  Airway patency: patent    PONV status at discharge: No PONV  Anesthetic complications: no      Cardiovascular status: blood pressure returned to baseline  Respiratory status: unassisted, room air and spontaneous ventilation  Hydration status: euvolemic  Follow-up not needed.              Vitals Value Taken Time   /85 04/08/24 0709   Temp 37.3 °C (99.1 °F) 04/08/24 0709   Pulse 106 04/08/24 0709   Resp 18 04/08/24 0709   SpO2 94 % 04/08/24 0709         Event Time   Out of Recovery 15:46:00         Pain/Natasha Score: Pain Rating Prior to Med Admin: 7 (4/8/2024  6:07 AM)  Pain Rating Post Med Admin: 5 (4/8/2024  3:49 AM)

## 2024-04-08 NOTE — PT/OT/SLP PROGRESS
Physical Therapy Treatment    Patient Name:  Dexter Ward   MRN:  [id number]    Recommendations:     Discharge Recommendations: No Therapy Indicated  Discharge Equipment Recommendations: walker, rolling  Barriers to discharge: None    Assessment:     Dexter Ward is a 53 y.o. male admitted with a medical diagnosis of <principal problem not specified>.  He presents with the following impairments/functional limitations: weakness, impaired endurance, impaired self care skills, impaired functional mobility, gait instability, impaired balance, pain, orthopedic precautions. Pt tolerated activity with improved mobility reflected by increased distance ambulated with decreased assistance required. Pt reports pain in L knee with flexion, able to bear weight. Pt is progressing well toward goals. Pt would continue to benefit from acute skilled therapy intervention to address deficits and progress toward prior level of function.       Rehab Prognosis: Good; patient would benefit from acute skilled PT services to address these deficits and reach maximum level of function.    Recent Surgery: Procedure(s) (LRB):  ROBOTIC REVISION L3-S1 FUSION (N/A) 3 Days Post-Op    Plan:     During this hospitalization, patient to be seen 3 x/week to address the identified rehab impairments via gait training, therapeutic activities, therapeutic exercises, neuromuscular re-education and progress toward the following goals:    Plan of Care Expires:  05/06/24    Subjective     Chief Complaint: L knee pain  Patient/Family Comments/goals: to get better   Pain/Comfort:  Pain Rating 1:  (pain reported in back and posterior L knee, did not quantify)  Pain Addressed 1: Pre-medicate for activity, Reposition, Distraction, Cessation of Activity, Nurse notified  Pain Rating Post-Intervention 1:  (pain still present)      Objective:     Communicated with RN prior to session.  Patient found right sidelying with hemovac, telemetry upon PT entry to  room.     General Precautions: Standard, fall  Orthopedic Precautions: spinal precautions  Braces: LSO  Respiratory Status: Room air     Functional Mobility:  Bed Mobility:     Supine to Sit: stand by assistance, placed HOB in upright position. Declined to attempt with bed flat   Transfers:     Sit to Stand:  stand by assistance with rolling walker  Gait: Pt ambulated 180 ft with a RW and supervision. Pt demo'd small step size, decreased foot clearance, slow gait speed, decreased L knee flexion with slight circumduction of L LE to clear foot in swing phase. Cuing provided for forward gaze and upright posture.       AM-PAC 6 CLICK MOBILITY  Turning over in bed (including adjusting bedclothes, sheets and blankets)?: 4  Sitting down on and standing up from a chair with arms (e.g., wheelchair, bedside commode, etc.): 3  Moving from lying on back to sitting on the side of the bed?: 3  Moving to and from a bed to a chair (including a wheelchair)?: 3  Need to walk in hospital room?: 4  Climbing 3-5 steps with a railing?: 3  Basic Mobility Total Score: 20       Treatment & Education:  Pt educated on role of PT/POC. Pt verbalized understanding.   Pt encouraged to only perform OOB mobility with assistance from nursing/therapy. Pt agreeable.   Pt encouraged to ambulate daily with assistance/supervision from nursing/therapy. Pt agreeable.      Patient left  sitting on toilet  with all lines intact and RN present..    GOALS:   Multidisciplinary Problems       Physical Therapy Goals          Problem: Physical Therapy    Goal Priority Disciplines Outcome Goal Variances Interventions   Physical Therapy Goal     PT, PT/OT Ongoing, Progressing     Description: Goals to be met by: 24     Patient will increase functional independence with mobility by performin. Supine to sit with Modified [place]  2. Sit to supine with Modified [place]  3. Sit to stand transfer with Modified [place]  4. Bed to chair transfer  with Modified [place] using LRAD as needed  5. Gait  x 150 feet with Modified [place] using LRAD as needed.   6. Ascend/descend 2 stair with no Handrails with Supervision   7. Lower extremity exercise program x15 reps per handout, with assistance as needed                         Time Tracking:     PT Received On: 04/08/24  PT Start Time: 1428     PT Stop Time: 1451  PT Total Time (min): 23 min     Billable Minutes: Gait Training 15 mins  and Therapeutic Activity 8 mins     Treatment Type: Treatment  PT/PTA: PT     Number of PTA visits since last PT visit: 0     04/08/2024

## 2024-04-09 VITALS
OXYGEN SATURATION: 96 % | HEIGHT: 72 IN | SYSTOLIC BLOOD PRESSURE: 135 MMHG | TEMPERATURE: 98 F | DIASTOLIC BLOOD PRESSURE: 78 MMHG | WEIGHT: 303.81 LBS | BODY MASS INDEX: 41.15 KG/M2 | HEART RATE: 107 BPM | RESPIRATION RATE: 18 BRPM

## 2024-04-09 PROBLEM — E66.01 SEVERE OBESITY (BMI >= 40): Status: ACTIVE | Noted: 2024-04-09

## 2024-04-09 PROBLEM — J98.11 ATELECTASIS: Status: ACTIVE | Noted: 2024-04-09

## 2024-04-09 PROBLEM — R52 PAIN: Status: ACTIVE | Noted: 2024-04-09

## 2024-04-09 PROBLEM — E87.1 HYPONATREMIA: Status: ACTIVE | Noted: 2024-04-09

## 2024-04-09 LAB
POCT GLUCOSE: 140 MG/DL (ref 70–110)
POCT GLUCOSE: 154 MG/DL (ref 70–110)

## 2024-04-09 PROCEDURE — 94761 N-INVAS EAR/PLS OXIMETRY MLT: CPT

## 2024-04-09 PROCEDURE — 25000003 PHARM REV CODE 250: Performed by: PHYSICIAN ASSISTANT

## 2024-04-09 PROCEDURE — 99024 POSTOP FOLLOW-UP VISIT: CPT | Mod: ,,,

## 2024-04-09 PROCEDURE — 25000003 PHARM REV CODE 250

## 2024-04-09 RX ORDER — ACETAMINOPHEN 500 MG
1000 TABLET ORAL EVERY 8 HOURS PRN
Qty: 30 TABLET | Refills: 0 | Status: SHIPPED | OUTPATIENT
Start: 2024-04-09

## 2024-04-09 RX ADMIN — POLYETHYLENE GLYCOL 3350 17 G: 17 POWDER, FOR SOLUTION ORAL at 08:04

## 2024-04-09 RX ADMIN — LIDOCAINE 5% 2 PATCH: 700 PATCH TOPICAL at 08:04

## 2024-04-09 RX ADMIN — ACETAMINOPHEN 1000 MG: 500 TABLET ORAL at 05:04

## 2024-04-09 RX ADMIN — DIAZEPAM 5 MG: 5 TABLET ORAL at 05:04

## 2024-04-09 RX ADMIN — LEVOTHYROXINE SODIUM 100 MCG: 100 TABLET ORAL at 05:04

## 2024-04-09 RX ADMIN — DOCUSATE SODIUM AND SENNOSIDES 2 TABLET: 8.6; 5 TABLET, FILM COATED ORAL at 08:04

## 2024-04-09 RX ADMIN — ATORVASTATIN CALCIUM 10 MG: 10 TABLET, FILM COATED ORAL at 08:04

## 2024-04-09 RX ADMIN — ACETAMINOPHEN 1000 MG: 500 TABLET ORAL at 12:04

## 2024-04-09 RX ADMIN — OXYCODONE HYDROCHLORIDE 10 MG: 10 TABLET ORAL at 08:04

## 2024-04-09 RX ADMIN — METHOCARBAMOL 750 MG: 750 TABLET ORAL at 05:04

## 2024-04-09 RX ADMIN — OXYCODONE HYDROCHLORIDE 10 MG: 10 TABLET ORAL at 12:04

## 2024-04-09 RX ADMIN — DIAZEPAM 5 MG: 5 TABLET ORAL at 12:04

## 2024-04-09 RX ADMIN — OXYCODONE HYDROCHLORIDE 10 MG: 10 TABLET ORAL at 03:04

## 2024-04-09 NOTE — PLAN OF CARE
POC reviewed and discussed with pt.       Problem: Adult Inpatient Plan of Care  Goal: Plan of Care Review  Flowsheets (Taken 4/8/2024 0540)  Plan of Care Reviewed With: patient  Goal: Absence of Hospital-Acquired Illness or Injury  Intervention: Identify and Manage Fall Risk  Flowsheets (Taken 4/8/2024 0540)  Safety Promotion/Fall Prevention:   assistive device/personal item within reach   bed alarm set   Fall Risk reviewed with patient/family   Fall Risk signage in place   room near unit station   side rails raised x 2   supervised activity   instructed to call staff for mobility  Intervention: Prevent Skin Injury  Flowsheets (Taken 4/8/2024 0540)  Body Position: position changed independently  Intervention: Prevent and Manage VTE (Venous Thromboembolism) Risk  Flowsheets (Taken 4/8/2024 0540)  Activity Management: Ambulated in rivera - L4  VTE Prevention/Management:   ambulation promoted   bleeding risk assessed   fluids promoted   ROM (active) performed  Range of Motion: ROM (range of motion) performed  Intervention: Prevent Infection  Flowsheets (Taken 4/8/2024 0540)  Infection Prevention:   hand hygiene promoted   equipment surfaces disinfected   single patient room provided   rest/sleep promoted     Problem: Diabetes Comorbidity  Goal: Blood Glucose Level Within Targeted Range  Intervention: Monitor and Manage Glycemia  Flowsheets (Taken 4/8/2024 0540)  Glycemic Management: blood glucose monitored     Problem: Infection  Goal: Absence of Infection Signs and Symptoms  Intervention: Prevent or Manage Infection  Flowsheets (Taken 4/8/2024 0540)  Infection Management: (IV abx given per MAR) other (see comments)     Problem: Pain Acute  Goal: Acceptable Pain Control and Functional Ability  Intervention: Develop Pain Management Plan  Flowsheets (Taken 4/8/2024 0540)  Pain Management Interventions:   around-the-clock dosing utilized   care clustered   pain management plan reviewed with patient/caregiver   pillow  support provided   quiet environment facilitated   position adjusted

## 2024-04-09 NOTE — NURSING
Discharge instructions printed and reviewed with patient. Pending delivery of rolling walker to bedside. PIV and Tele removed. Spouse at bedside to transport patient home.

## 2024-04-09 NOTE — DISCHARGE SUMMARY
Nathan Barber - Neurosurgery ([facility])  Neurosurgery  Discharge Summary      Patient Name: Dexter Ward  MRN: [id number]  Admission Date: 4/5/2024  Hospital Length of Stay: 4 days  Discharge Date and Time:  04/09/2024 10:28 AM  Attending Physician: Gilson Starkey DO   Discharging Provider: Naz Huffman PA-C  Primary Care Provider: Mateo Lyman MD    HPI:   53 M presents for elective L3-5 reivision fusion on 4/5/2024.     Procedure(s) (LRB):  ROBOTIC REVISION L3-S1 FUSION (N/A)     Hospital Course: 4/6: OR yesterday for revision fusion L3-5. Tolerated procedure well. Expected low back pain, no lower extremity pain. Burk removed, pending voiding.   4/7: Tachy yesterday to 150s, workup negative. Denies chest pain / palpitations / SOB. Neruo exam stable. If continued issues today will consult medicine. Drains to remain today.  4/8: NAEON. Vitals WNL today. C/o of significant L knee pain. Has a h/o of L knee pain a surgery on it years ago. States it often gives him trouble. Also states his back pain is only controlled with dilaudid. Dc'ed dilaudid today and adjusted his oral pain medications in order to likely discharge patient home. Ambulating ad silvia. Drains with deep 5 cc and superficial 70 cc output. Will remove if dc today. No BM yet, adding miralax.    Goals of Care Treatment Preferences:         Consults:     Significant Diagnostic Studies: Labs: CMP   Recent Labs   Lab 04/08/24  0752   *   K 3.8      CO2 22*   *   BUN 11   CREATININE 0.8   CALCIUM 9.0   PROT 7.0   ALBUMIN 2.6*   BILITOT 1.1*   ALKPHOS 51*   AST 14   ALT 13   ANIONGAP 9   , CBC   Recent Labs   Lab 04/08/24  0753   WBC 20.90*   HGB 13.6*   HCT 39.4*      , and All labs within the past 24 hours have been reviewed  Radiology: X-Ray:  lumbar spine    Pending Diagnostic Studies:       None          Final Active Diagnoses:    Diagnosis Date Noted POA    PRINCIPAL PROBLEM:  Pseudarthrosis after fusion or  arthrodesis [M96.0] 04/01/2024 Not Applicable      Problems Resolved During this Admission:      Discharged Condition: good     Disposition: Home or Self Care    Follow Up:    Patient Instructions:      Notify your health care provider if you experience any of the following:  temperature >100.4     Notify your health care provider if you experience any of the following:  persistent nausea and vomiting or diarrhea     Notify your health care provider if you experience any of the following:  severe uncontrolled pain     Notify your health care provider if you experience any of the following:  redness, tenderness, or signs of infection (pain, swelling, redness, odor or green/yellow discharge around incision site)     Notify your health care provider if you experience any of the following:  difficulty breathing or increased cough     Notify your health care provider if you experience any of the following:  severe persistent headache     Notify your health care provider if you experience any of the following:  worsening rash     Notify your health care provider if you experience any of the following:  persistent dizziness, light-headedness, or visual disturbances     Notify your health care provider if you experience any of the following:  increased confusion or weakness     Activity as tolerated     Medications:  Reconciled Home Medications:      Medication List        START taking these medications      acetaminophen 500 MG tablet  Commonly known as: TYLENOL  Take 2 tablets (1,000 mg total) by mouth every 8 (eight) hours as needed for Pain.     methocarbamoL 500 MG Tab  Commonly known as: ROBAXIN  Take 2 tablets (1,000 mg total) by mouth 3 (three) times daily. for 10 days     oxyCODONE 10 mg Tab immediate release tablet  Commonly known as: ROXICODONE  Take 1 tablet (10 mg total) by mouth every 6 (six) hours as needed for Pain.            CONTINUE taking these medications      atorvastatin 10 MG tablet  Commonly known  as: LIPITOR  TAKE ONE-HALF TABLET BY MOUTH EVERY DAY FOR CHOLESTEROL     cholecalciferol (vitamin D3) 50 mcg (2,000 unit) Tab  Commonly known as: VITAMIN D3  TAKE ONE TABLET BY MOUTH DAILY AS A VITAMIN D3 SUPPLEMENT     diclofenac sodium 1 % Gel  Commonly known as: VOLTAREN  Apply topically.     LIDOcaine 5 %  Commonly known as: LIDODERM  2 patches as needed.     methylphenidate HCl 20 MG tablet  Commonly known as: RITALIN  Take 20 mg by mouth 2 (two) times daily.     naloxone 4 mg/actuation Spry  Commonly known as: NARCAN  SMARTSIG:Both Nares     semaglutide 2 mg/dose (8 mg/3 mL) Pnij  Commonly known as: OZEMPIC  Sundays     SYNTHROID 100 MCG tablet  Generic drug: levothyroxine  100 mcg before breakfast.            STOP taking these medications      HYDROcodone-acetaminophen 7.5-325 mg per tablet  Commonly known as: NORCO     ibuprofen 800 MG tablet  Commonly known as: GENIA RIVERA PA-C  Neurosurgery  [facility] - Neurosurgery [facility])

## 2024-04-09 NOTE — CARE UPDATE
I have reviewed the chart of Dexter Ward who is hospitalized for the following:    Active Hospital Problems    Diagnosis    *Pseudarthrosis after fusion or arthrodesis    Hyponatremia     Monitored with daily cmo      Atelectasis     Lungs: Atelectasis in the base of the left hemithorax.  No pleural effusion is noted.   IS   Deep breathing exercise      Pain     L knee pain   Prn pain medicine      Severe obesity (BMI >= 40)     Severe obesity      Hyperlipidemia    Hypothyroidism    Major depression, recurrent    Type 2 diabetes mellitus    Attention deficit hyperactivity disorder, combined type        Leticia Alaniz NP  [facility]

## 2024-04-09 NOTE — PLAN OF CARE
[facility] - Neurosurgery (Hospital)  Discharge Final Note    Primary Care Provider: Mateo Lyman MD    Expected Discharge Date: 4/9/2024    Patient to be discharged home.  CM sent discharge planning worksheet to [facility] for [facility] health and [facility] walker.  [facility]  Family to provide transportation home.  Neurosurgery clinic to schedule follow up appointment.    Your fax has been successfully sent to [phone] at [phone].  ------------------------------------------------------------  From: [email or web address]  ------------------------------------------------------------  4/9/2024 2:53:31 PM Transmission Record      CM to cost transfer rolling walk[facility] as [facility] to [facility] provide and this could be of concern for patient ambulation.    Future Appointments   Date Time Provider Department Center   4/24/2024  1:00 PM Mindy Valenzuela RN [facility] NEUROS8 [facility]   5/20/2024  2:30 PM Elyssa Ortega NP [facility] NEUROS8 [facility]   5/27/2024  1:30 PM [facility]   7/15/2024  3:00 PM Gilson Starkey DO [facility] NEUROS8 [facility]        Final Discharge Note (most recent)       Final Note - 04/09/24 1229          Final Note    Assessment Type Final Discharge Note     Anticipated Discharge Disposition Home-Health Care Svc        Post-Acute Status    Post-Acute Authorization Home Health;HME     HME Status Referrals Sent     Home Health Status Referrals Sent     Discharge Delays Home Medical Equipment (Insurance, Delivery)                     Important Message from Medicare

## 2024-04-10 ENCOUNTER — NURSE TRIAGE (OUTPATIENT)
Dept: ADMINISTRATIVE | Facility: CLINIC | Age: 54
End: 2024-04-10
Payer: OTHER GOVERNMENT

## 2024-04-11 ENCOUNTER — NURSE TRIAGE (OUTPATIENT)
Dept: ADMINISTRATIVE | Facility: CLINIC | Age: 54
End: 2024-04-11
Payer: OTHER GOVERNMENT

## 2024-04-11 ENCOUNTER — TELEPHONE (OUTPATIENT)
Dept: NEUROSURGERY | Facility: CLINIC | Age: 54
End: 2024-04-11
Payer: OTHER GOVERNMENT

## 2024-04-11 ENCOUNTER — PATIENT MESSAGE (OUTPATIENT)
Dept: NEUROSURGERY | Facility: CLINIC | Age: 54
End: 2024-04-11
Payer: OTHER GOVERNMENT

## 2024-04-11 NOTE — TELEPHONE ENCOUNTER
Pt was discharged from hospital today after back surgery 5 days ago. Main incision is leaking and has some blood tinged clear fluid leaking from it. Wound does not look open. Pt reports it felt like a blister was forming through the day  and then popped.     Dispo is to call PCP now. Call to Dr. Aguila on-call neurosurgery who recommends that they should collect fluid in a cup and keep reapplying dressing. If he continues to soak through dressings in the morning then he should follow up in clinic to be evaluated. Any fever chills or positional headaches or new weakness or numbness, pt should go to the ED. Notified CG of the above and they v/u.   Reason for Disposition   Dressing soaked with blood or body fluid (e.g., drainage)    Additional Information   Negative: [1] Major abdominal surgical incision AND [2] wound gaping open AND [3] visible internal organs   Negative: Sounds like a life-threatening emergency to the triager   Negative: Severe pain in the incision   Negative: [1] Incision gaping open AND [2] < 48 hours since wound re-opened   Negative: [1] Incision gaping open AND [2] length of opening > 2 inches (5 cm)   Negative: Patient sounds very sick or weak to the triager   Negative: Sounds like a serious complication to the triager   Negative: Fever > 100.4 F (38.0 C)   Negative: [1] Incision looks infected (spreading redness, pain) AND [2] fever > 99.5 F (37.5 C)   Negative: [1] Incision looks infected (spreading redness, pain) AND [2] large red area (> 2 in. or 5 cm)   Negative: [1] Incision looks infected (spreading redness, pain) AND [2] face wound   Negative: [1] Red streak runs from the incision AND [2] longer than 1 inch (2.5 cm)   Negative: [1] Pus or bad-smelling fluid draining from incision AND [2] no fever   Negative: [1] Post-op pain AND [2] not controlled with pain medications    Protocols used: Post-Op Incision Symptoms and [id number]

## 2024-04-11 NOTE — TELEPHONE ENCOUNTER
Pt called in last night to triage line for draining wound. Leakage has slacked up but still present. Incision is still oozing and seeping through 2 gauze pads. Pt was told to come into clinic today to be evaluated. Pt can not physically make the drive to [place] per cg.  Cg instructed to bring pt to nearest Er to be evaluated. Cg stated the will go to [facility].   Reason for Disposition   [1] Follow-up call to recent contact AND [2] information only call, no triage required    Protocols used: Information Only Call-A-

## 2024-04-11 NOTE — TELEPHONE ENCOUNTER
Pt reports drain was removed on Tuesday before discharge from hospital, was having some drainage from the incision so went to the local ED today (pt states he lives a few hours away from [place]) and they did some pressure packing and advised pt to reach out to his surgeon for follow up care, so that is why he is calling now. Pt advised to call the office when open to discuss follow up with Dr. Starkey, as he is not on call right now. Otherwise, this service can triage him for any current symptoms or reach out to an on call MD to get their opinion on how soon he needs to be seen follow up wise, if he needs to make the trip to [facility]. Pt declined at this time, states he will 'stay the course' for [facility] and will reach out in the morning to Dr. Starkey's office. Pt encouraged to call back with any worsening symptoms or questions. He verbalized understanding.    Reason for Disposition   [1] Caller requesting NON-URGENT health information AND [2] PCP's office is the best resource    Protocols used: Information Only Call - No Triage-A-

## 2024-04-11 NOTE — TELEPHONE ENCOUNTER
----- Message from Myra Stubbs sent at 4/11/2024  2:03 PM CDT -----  Regarding: question  Contact: @[phone]  Pt called in regards to being post op 5 days and he is having a problem and having a lot drainage and he just return from the ED and would like a call to know the next plan of care  .....Please call and adv @[phone]

## 2024-04-12 ENCOUNTER — PATIENT MESSAGE (OUTPATIENT)
Dept: NEUROSURGERY | Facility: CLINIC | Age: 54
End: 2024-04-12
Payer: OTHER GOVERNMENT

## 2024-04-12 ENCOUNTER — TELEPHONE (OUTPATIENT)
Dept: NEUROSURGERY | Facility: CLINIC | Age: 54
End: 2024-04-12
Payer: OTHER GOVERNMENT

## 2024-04-12 NOTE — TELEPHONE ENCOUNTER
Informed patient that pressure dressing should be placed on incision at all times and if his incision is saturating more than 3 dressing he should f/u with nearest ED patient verbally understood.

## 2024-04-12 NOTE — TELEPHONE ENCOUNTER
----- Message from Radha Yeager sent at 4/12/2024  8:36 AM CDT -----  Regarding: Callback  Contact: [phone]  Patient calling requesting a callback from nurse Lisseth or provider. Please call back as soon as possible.

## 2024-04-13 ENCOUNTER — PATIENT MESSAGE (OUTPATIENT)
Dept: NEUROSURGERY | Facility: CLINIC | Age: 54
End: 2024-04-13
Payer: OTHER GOVERNMENT

## 2024-04-14 ENCOUNTER — PATIENT MESSAGE (OUTPATIENT)
Dept: NEUROSURGERY | Facility: CLINIC | Age: 54
End: 2024-04-14
Payer: OTHER GOVERNMENT

## 2024-04-15 ENCOUNTER — TELEPHONE (OUTPATIENT)
Dept: NEUROSURGERY | Facility: CLINIC | Age: 54
End: 2024-04-15
Payer: OTHER GOVERNMENT

## 2024-04-15 ENCOUNTER — OFFICE VISIT (OUTPATIENT)
Dept: NEUROSURGERY | Facility: CLINIC | Age: 54
End: 2024-04-15
Payer: OTHER GOVERNMENT

## 2024-04-15 DIAGNOSIS — Z78.9 PRESENCE OF SURGICAL INCISION: Primary | ICD-10-CM

## 2024-04-15 DIAGNOSIS — Z98.1 S/P SPINAL FUSION: ICD-10-CM

## 2024-04-15 PROCEDURE — 99024 POSTOP FOLLOW-UP VISIT: CPT | Mod: S$PBB,,,

## 2024-04-15 NOTE — TELEPHONE ENCOUNTER
----- Message from Farhan Julian sent at 4/15/2024 11:56 AM CDT -----  Regarding: Home Health [phone]  Type: Patient Call Back    Who called: Home Health     What is the request in detail: called to notify the doctor that they have been trying to see the pt for a couple weeks now and were unsuccessful both times. Would like a call back.     Can the clinic reply by [facility]? No     Would the patient rather a call back or a response via [facility]? Call back     Best call back number: [phone]    Additional Information:    Thank you.

## 2024-04-18 NOTE — PROGRESS NOTES
Neurosurgery  Established Patient    SUBJECTIVE:     History of Present Illness:  Dexter Ward is a 53 y.o. male who is s/p L3-5 reivision fusion on 4/5/2024. Patient was recently discharged home on 4/9. He presents today with complaints of excessive drainage from his lumbar incision. He reports yellow/pink tinged drainage leaking from his incision. He states it stopped for a day or two but started again. He denies new focal weakness, paraesthesias, bowel/bladder dysfunction, fevers/chills or purulent drainage from the incision. His wife has been doing pressure dressings daily which seems to have helped some.       Review of patient's allergies indicates:   Allergen Reactions    Prazosin Hives, Rash and Shortness Of Breath    Metformin Diarrhea    Opioids - morphine analogues     Morphine Hallucinations, Itching and Nausea And Vomiting       Current Outpatient Medications   Medication Sig Dispense Refill    acetaminophen (TYLENOL) 500 MG tablet Take 2 tablets (1,000 mg total) by mouth every 8 (eight) hours as needed for Pain. 30 tablet 0    atorvastatin (LIPITOR) 10 MG tablet TAKE ONE-HALF TABLET BY MOUTH EVERY DAY FOR CHOLESTEROL      cholecalciferol, vitamin D3, (VITAMIN D3) 50 mcg (2,000 unit) Tab TAKE ONE TABLET BY MOUTH DAILY AS A VITAMIN D3 SUPPLEMENT      diclofenac sodium (VOLTAREN) 1 % Gel Apply topically.      levothyroxine (SYNTHROID) 100 MCG tablet 100 mcg before breakfast.      LIDOcaine (LIDODERM) 5 % 2 patches as needed.      methocarbamoL (ROBAXIN) 500 MG Tab Take 2 tablets (1,000 mg total) by mouth 3 (three) times daily. for 10 days 60 tablet 0    methylphenidate HCl (RITALIN) 20 MG tablet Take 20 mg by mouth 2 (two) times daily.      naloxone (NARCAN) 4 mg/actuation Spry SMARTSIG:Both Nares      oxyCODONE (ROXICODONE) 10 mg Tab immediate release tablet Take 1 tablet (10 mg total) by mouth every 6 (six) hours as needed for Pain. 30 tablet 0    semaglutide (OZEMPIC) 2 mg/dose (8 mg/3 mL) PnIj  No availability , spouse stated will call back Sundays       No current facility-administered medications for this visit.       Past Medical History:   Diagnosis Date    Depression     Diabetes mellitus, type 2     Hyperlipidemia     Hypothyroidism     Pulmonary embolism      Past Surgical History:   Procedure Laterality Date    ANKLE SURGERY Bilateral     x 2- right;; 9 to left    BACK SURGERY      multiple    KNEE ARTHROSCOPY Left     ROBOTIC FUSION,SPINE,LUMBAR,TLIF N/A 4/5/2024    Procedure: ROBOTIC REVISION L3-S1 FUSION;  Surgeon: Gilson Starkey DO;  Location: [facility] OR [address] [place] [address];  Service: Neurosurgery;  Laterality: N/A;  [place] I  ASA I BLOOD TYPE & SCREEN BRACE LSO RADIOLOGY C-ARM NEURO MON: EMG SEP POSITION PRONE BED TANVI 4 POST HEADREST PRONE VIEW MISC. EQUIP: AHMAID SPECIALTY EQUIPTMENT GLOBUS     Family History       Problem Relation (Age of Onset)    No Known Problems Mother, Father          Social History     Socioeconomic History    Marital status:    Tobacco Use    Smoking status: Never    Smokeless tobacco: Never   Substance and Sexual Activity    Alcohol use: Not Currently     Social Determinants of Health     Financial Resource Strain: Low Risk  (4/9/2024)    Overall Financial Resource Strain (CARDIA)     Difficulty of Paying Living Expenses: Not hard at all   Food Insecurity: No Food Insecurity (4/9/2024)    Hunger Vital Sign     Worried About Running Out of Food in the Last Year: Never true     Ran Out of Food in the Last Year: Never true   Transportation Needs: No Transportation Needs (4/9/2024)    PRAPARE - Transportation     Lack of Transportation (Medical): No     Lack of Transportation (Non-Medical): No   Physical Activity: Sufficiently Active (4/9/2024)    Exercise Vital Sign     Days of Exercise per Week: 5 days     Minutes of Exercise per Session: 60 min   Recent Concern: Physical Activity - Insufficiently Active (4/6/2024)    Exercise Vital Sign     Days of Exercise per Week: 2 days     Minutes of Exercise per Session:  60 min   Stress: Stress Concern Present (4/9/2024)    [place] [employer] - Occupational Stress Questionnaire     Feeling of Stress : Rather much   Social Connections: Socially Isolated (4/9/2024)    Social Connection and Isolation Panel [NHANES]     Frequency of Communication with Friends and Family: Once a week     Frequency of Social Gatherings with Friends and Family: Once a week     Attends Synagogue Services: Never     Active Member of Clubs or Organizations: No     Attends Club or Organization Meetings: Never     Marital Status:    Housing Stability: Low Risk  (4/9/2024)    Housing Stability Vital Sign     Unable to Pay for Housing in the Last Year: No     Number of Places Lived in the Last Year: 1     Unstable Housing in the Last Year: No       Review of Systems  Positive per HPI, otherwise a pertinent ROS was performed and was negative.      OBJECTIVE:     Vital Signs     There is no height or weight on file to calculate BMI.    Neurosurgery Physical Exam  General: Well developed, well nourished, no distress.   Head: Normocephalic, atraumatic.  Mental Status: Awake, Alert, Oriented x 4  Language: No aphasia  Speech: No dysarthria  Cranial nerves: Face symmetric, tongue midline, CN II-XII grossly intact.   Eyes: Pupils equal, round, reactive to light with accomodation, EOMI.   Pulmonary: Normal respirations, no signs of respiratory distress.  Abdomen: Soft, non-distended, not tender to palpation.  Vascular: Pulses 2+ and symmetric radial and dorsalis pedis. No LE edema.   Skin: Skin is warm, dry and intact.  Sensory: Intact to light touch throughout.  Motor Strength: Moves all extremities spontaneously with good tone. No abnormal movements seen.     Incision: C/D/I with skin edges well approximated with monocryl. Prineo removed. No surrounding erythema or edema. No drainage from incision on palpation or passively. No palpable hematoma or underlying fluid collection.    Diagnostic  Results:  No imaging for review.      ASSESSMENT/PLAN:     Dexter Ward is a 53 y.o. male who is s/p L3-5 revision fusion on 4/5/24 who presents with incisional drainage. There is no drainage able to be expressed on palpation. His incision appears clean/dry and intact with the monocryl. The prineo was removed. There are no signs of breakdown or infection. There is no purulence, erythema or significant swelling. I have advised the patient to continue pressure dressings as well as to do frequent dressing changes in order to keep the incision clean and dry. I have provided betadine swabs to be used every 2-3 days. Dressing supplies provided. Patient returns to clinic for his post op visit net week. Will assess then.     All uregnt/emergent signs/symptoms including those of infection or wound breakdown that require immediate evaluation discussed and the patient voiced understanding.    Diagnoses addressed and orders placed this encounter:      Presence of surgical incision    S/P spinal fusion        Naz Huffman PA-C  Neurosurgery   [facility]

## 2024-04-22 NOTE — PROGRESS NOTES
Dexter Faria a 53 y.o. male here for 2 week post op wound check.    Surgery: Pt is POD 19 FROM Robotic Revision L3-s1 Fusion on 4/5/2024 by Dr. Starkey.    DME: walker    Brace in Use: TLSO    SUBJECTIVE    New Symptoms: pt c/o pain to right knee      PAIN MANAGEMENT     6, upon movement to right knee      INCISION (S)    Location: lumbar/sacral    Incision Status: Clean, Dry, LAKSHMI. Edges well-approximated. No drainage or swelling noted. Marcelle Fontana PA-C examined incision. Instructed may use aquafor or vaseline to scabs. Also to order medrol dose pack and lyrica for pt.     PICTURE        INTERVENTION    Incision: Dissolvable Sutures in Place    Medication Refills Requested: None       EDUCATION    Reviewed Post Op instructions with patient/caregiver.  Keep incision LAKSHMI, no lotions, creams or bandages.  Ok to shower without direct water pressure to incision. Pat dry after shower.  Do not submerge wound in bath tub or go swimming until released by surgeon.  No lifting > 10lbs.  No twisting or bending surgical area greater than 45 degrees from neutral position.  No driving or operating machinery while taking narcotic pain medication or muscle relaxers and cleared by surgeon.  Wear brace at all times except when lying in bed, reclining or taking a shower.  Patient encouraged to walk as much as possible but advised to walk with someone and rest as necessary.  Do not take ANY non-steroidal anti-inflammatory drugs (NSAIDS), including the following: ibuprofen, naprosyn Aleve, Advil, Indocin, Mobic, or Celebrex for 6 weeks following surgery.   Take over the counter stool softner/laxative for constipation.  Call your doctor or report to the Emergency Room for any signs of infection, including: increased redness, drainage, pain or fever (temperature greater than or equal to 101.5 for 24 hours). Call doctor or go to the Emergency Room if there are any localized neurological changes; problems with speech, vision,  numbness, tingling, weakness, or severe headache; or for other concerns.      Written copy of post op instructions provided to patient/caregiver. All questions answered. Pt/caregiver encouraged to call clinic or send message through portal with any future concerns. Patient/caregiver verbalized understanding.     FOLLOW UP    Future Appointments   Date Time Provider Department Center   4/24/2024  1:00 PM Mindy Valenzuela, ASHLEY [facility] NEUROS [facility]   5/20/2024  2:30 PM Elyssa Ortega NP [facility] NEUROS [facility]   5/27/2024  1:30 PM [facility] OIC-XRAY [facility]   7/15/2024  3:00 PM Gilson Starkey DO [facility] NEUROS [facility]

## 2024-04-24 ENCOUNTER — CLINICAL SUPPORT (OUTPATIENT)
Dept: NEUROSURGERY | Facility: CLINIC | Age: 54
End: 2024-04-24
Payer: OTHER GOVERNMENT

## 2024-04-24 DIAGNOSIS — T42.6X5A: ICD-10-CM

## 2024-04-24 DIAGNOSIS — M54.16 LUMBAR RADICULOPATHY: Primary | ICD-10-CM

## 2024-04-24 PROCEDURE — 99999 PR PBB SHADOW E&M-EST. PATIENT-LVL I: CPT | Mod: PBBFAC,,,

## 2024-04-24 PROCEDURE — 99211 OFF/OP EST MAY X REQ PHY/QHP: CPT | Mod: PBBFAC

## 2024-04-24 RX ORDER — PREGABALIN 75 MG/1
75 CAPSULE ORAL 2 TIMES DAILY
Qty: 60 CAPSULE | Refills: 6 | Status: SHIPPED | OUTPATIENT
Start: 2024-04-24 | End: 2024-05-20

## 2024-04-24 RX ORDER — METHYLPREDNISOLONE 4 MG/1
TABLET ORAL
Qty: 21 EACH | Refills: 0 | Status: SHIPPED | OUTPATIENT
Start: 2024-04-24 | End: 2024-05-15

## 2024-04-29 ENCOUNTER — TELEPHONE (OUTPATIENT)
Dept: NEUROSURGERY | Facility: CLINIC | Age: 54
End: 2024-04-29
Payer: OTHER GOVERNMENT

## 2024-04-29 NOTE — TELEPHONE ENCOUNTER
----- Message from Shahrzad Rdz MA sent at 4/26/2024 12:18 PM CDT -----    ----- Message -----  From: Shahrzad López  Sent: 4/26/2024  10:49 AM CDT  To: Jaky BRASWELL Staff    Type: Patient Call Back    Who called:mary with [employer] [phone]    What is the request in detail:following up on attending physicians statement. Faxed over 4/19/24. Please fill out and sign it.     Can the clinic reply by [facility]?    Would the patient rather a call back or a response via [facility]? call    Best call back number:[phone] (home)       Additional Information:

## 2024-04-30 ENCOUNTER — TELEPHONE (OUTPATIENT)
Dept: NEUROSURGERY | Facility: CLINIC | Age: 54
End: 2024-04-30
Payer: OTHER GOVERNMENT

## 2024-04-30 ENCOUNTER — PATIENT MESSAGE (OUTPATIENT)
Dept: NEUROSURGERY | Facility: CLINIC | Age: 54
End: 2024-04-30
Payer: OTHER GOVERNMENT

## 2024-05-13 ENCOUNTER — TELEPHONE (OUTPATIENT)
Dept: NEUROSURGERY | Facility: CLINIC | Age: 54
End: 2024-05-13
Payer: OTHER GOVERNMENT

## 2024-05-13 NOTE — TELEPHONE ENCOUNTER
----- Message from Lakshmi Jackson sent at 5/13/2024  9:13 AM CDT -----  Regarding: Short Term Disability  Contact: Pt [phone]  Pt is calling to get paperwork filled out for Short Term Disability by Wednesday 5/15 please call

## 2024-05-20 ENCOUNTER — OFFICE VISIT (OUTPATIENT)
Dept: NEUROSURGERY | Facility: CLINIC | Age: 54
End: 2024-05-20
Payer: OTHER GOVERNMENT

## 2024-05-20 ENCOUNTER — HOSPITAL ENCOUNTER (OUTPATIENT)
Dept: RADIOLOGY | Facility: HOSPITAL | Age: 54
Discharge: HOME | End: 2024-05-20
Attending: STUDENT IN AN ORGANIZED HEALTH CARE EDUCATION/TRAINING PROGRAM
Payer: OTHER GOVERNMENT

## 2024-05-20 VITALS
HEART RATE: 119 BPM | TEMPERATURE: 98 F | WEIGHT: 303.81 LBS | SYSTOLIC BLOOD PRESSURE: 122 MMHG | BODY MASS INDEX: 41.2 KG/M2 | DIASTOLIC BLOOD PRESSURE: 87 MMHG

## 2024-05-20 DIAGNOSIS — M96.0 PSEUDARTHROSIS AFTER FUSION OR ARTHRODESIS: ICD-10-CM

## 2024-05-20 DIAGNOSIS — M48.07 SPINAL STENOSIS, LUMBOSACRAL REGION: Primary | ICD-10-CM

## 2024-05-20 DIAGNOSIS — Z98.1 S/P SPINAL FUSION: ICD-10-CM

## 2024-05-20 PROCEDURE — 99999 PR PBB SHADOW E&M-EST. PATIENT-LVL IV: CPT | Mod: PBBFAC,,, | Performed by: NURSE PRACTITIONER

## 2024-05-20 PROCEDURE — 72082 X-RAY EXAM ENTIRE SPI 2/3 VW: CPT | Mod: TC

## 2024-05-20 PROCEDURE — 99214 OFFICE O/P EST MOD 30 MIN: CPT | Mod: PBBFAC | Performed by: NURSE PRACTITIONER

## 2024-05-20 PROCEDURE — 72082 X-RAY EXAM ENTIRE SPI 2/3 VW: CPT | Mod: 26,,, | Performed by: RADIOLOGY

## 2024-05-20 PROCEDURE — 99024 POSTOP FOLLOW-UP VISIT: CPT | Mod: ,,, | Performed by: NURSE PRACTITIONER

## 2024-05-20 RX ORDER — METHOCARBAMOL 500 MG/1
500 TABLET, FILM COATED ORAL 4 TIMES DAILY PRN
Qty: 60 TABLET | Refills: 0 | Status: SHIPPED | OUTPATIENT
Start: 2024-05-20 | End: 2024-06-04

## 2024-05-20 RX ORDER — PREGABALIN 100 MG/1
100 CAPSULE ORAL 2 TIMES DAILY
Qty: 60 CAPSULE | Refills: 1 | Status: SHIPPED | OUTPATIENT
Start: 2024-05-20 | End: 2024-06-19

## 2024-05-20 NOTE — PROGRESS NOTES
Neurosurgery  Established Patient    SUBJECTIVE:     History of Present Illness: Dexter Ward is a 53 y.o. male s/p Posterior spinal revision/fusion L3-5 with Dr. Starkey on 4/5/24. He is being seen in clinic today for his 6 week post-op evaluation. States that he continues to struggle with back pain and leg weakness but is walking daily. Reports compliance with the brace. Rate his pain as a 7/10. States that the pain feels like nerve pain as he notices it when the lyrica is wearing off. Denies new neurological deficits. He continues to obtain pain medication from his pain provider.     Review of patient's allergies indicates:   Allergen Reactions    Prazosin Hives, Rash and Shortness Of Breath    Metformin Diarrhea    Opioids - morphine analogues     Morphine Hallucinations, Itching and Nausea And Vomiting       Current Outpatient Medications   Medication Sig Dispense Refill    acetaminophen (TYLENOL) 500 MG tablet Take 2 tablets (1,000 mg total) by mouth every 8 (eight) hours as needed for Pain. 30 tablet 0    atorvastatin (LIPITOR) 10 MG tablet TAKE ONE-HALF TABLET BY MOUTH EVERY DAY FOR CHOLESTEROL      cholecalciferol, vitamin D3, (VITAMIN D3) 50 mcg (2,000 unit) Tab TAKE ONE TABLET BY MOUTH DAILY AS A VITAMIN D3 SUPPLEMENT      diclofenac sodium (VOLTAREN) 1 % Gel Apply topically.      levothyroxine (SYNTHROID) 100 MCG tablet 100 mcg before breakfast.      LIDOcaine (LIDODERM) 5 % 2 patches as needed.      methylphenidate HCl (RITALIN) 20 MG tablet Take 20 mg by mouth 2 (two) times daily.      naloxone (NARCAN) 4 mg/actuation Spry SMARTSIG:Both Nares      oxyCODONE (ROXICODONE) 10 mg Tab immediate release tablet Take 1 tablet (10 mg total) by mouth every 6 (six) hours as needed for Pain. 30 tablet 0    semaglutide (OZEMPIC) 2 mg/dose (8 mg/3 mL) PnIj Sundays      methocarbamoL (ROBAXIN) 500 MG Tab Take 1 tablet (500 mg total) by mouth 4 (four) times daily as needed (muscle spasms). 60 tablet 0     pregabalin (LYRICA) 100 MG capsule Take 1 capsule (100 mg total) by mouth 2 (two) times daily. 60 capsule 1     No current facility-administered medications for this visit.       Past Medical History:   Diagnosis Date    Depression     Diabetes mellitus, type 2     Hyperlipidemia     Hypothyroidism     Pulmonary embolism      Past Surgical History:   Procedure Laterality Date    ANKLE SURGERY Bilateral     x 2- right;; 9 to left    BACK SURGERY      multiple    KNEE ARTHROSCOPY Left     ROBOTIC FUSION,SPINE,LUMBAR,TLIF N/A 4/5/2024    Procedure: ROBOTIC REVISION L3-S1 FUSION;  Surgeon: Gilson Starkey DO;  Location: [facility] OR [address] [place] [address];  Service: Neurosurgery;  Laterality: N/A;  [place] I  ASA I BLOOD TYPE & SCREEN BRACE LSO RADIOLOGY C-ARM NEURO MON: EMG SEP POSITION PRONE BED TANVI 4 POST HEADREST PRONE VIEW MISC. EQUIP: AHMADI SPECIALTY EQUIPTMENT GLOBUS     Family History       Problem Relation (Age of Onset)    No Known Problems Mother, Father          Social History     Socioeconomic History    Marital status:    Tobacco Use    Smoking status: Never    Smokeless tobacco: Never   Substance and Sexual Activity    Alcohol use: Not Currently     Social Determinants of Health     Financial Resource Strain: Low Risk  (4/9/2024)    Overall Financial Resource Strain (CARDIA)     Difficulty of Paying Living Expenses: Not hard at all   Food Insecurity: No Food Insecurity (4/9/2024)    Hunger Vital Sign     Worried About Running Out of Food in the Last Year: Never true     Ran Out of Food in the Last Year: Never true   Transportation Needs: No Transportation Needs (4/9/2024)    PRAPARE - Transportation     Lack of Transportation (Medical): No     Lack of Transportation (Non-Medical): No   Physical Activity: Sufficiently Active (4/9/2024)    Exercise Vital Sign     Days of Exercise per Week: 5 days     Minutes of Exercise per Session: 60 min   Recent Concern: Physical Activity - Insufficiently Active (4/6/2024)     Exercise Vital Sign     Days of Exercise per Week: 2 days     Minutes of Exercise per Session: 60 min   Stress: Stress Concern Present (4/9/2024)    [employer] [place] [employer] - Occupational Stress Questionnaire     Feeling of Stress : Rather much   Housing Stability: Low Risk  (4/9/2024)    Housing Stability Vital Sign     Unable to Pay for Housing in the Last Year: No     Number of Places Lived in the Last Year: 1     Unstable Housing in the Last Year: No       Review of Systems    OBJECTIVE:     Vital Signs  Temp: (P) 97.7 °F (36.5 °C)  Pulse: (!) 119  BP: 122/87  Pain Score:   7  Weight: (!) 137.8 kg (303 lb 12.7 oz)  Body mass index is 41.2 kg/m².    Neurosurgery Physical Exam  General: well developed, well nourished, no distress.   Head: normocephalic, atraumatic  Neurologic: Alert and oriented. Thought content appropriate.  GCS: Motor: 6/Verbal: 5/Eyes: 4 GCS Total: 15  Mental Status: Awake, Alert, Oriented x 4  Language: No aphasia  Speech: No dysarthria  Cranial nerves: face symmetric, tongue midline, CN II-XII grossly intact.   Eyes: pupils equal, round, reactive to light with accomodation, EOMI.   Pulmonary: normal respirations, no signs of respiratory distress  Abdomen: soft, non-distended  Skin: Skin is warm, dry and intact. Incision well-healed  Sensory: intact to light touch throughout  Motor Strength:Moves all extremities spontaneously with good tone.       Diagnostic Results:  There is no new imaging to review for this encounter.      ASSESSMENT/PLAN:     Dexter Ward is a 53 y.o. male s/p Posterior spinal revision/fusion L3-5 with Dr. Starkey on 4/5/24. He was seen in clinic today for his 6 week post-op evaluation. States that he continues to struggle with back pain and leg weakness but is walking daily. I have ordered PT to assist with his mobility and pain. Medications refilled and increased Lyrica dose. A CT lumbar spine to obtain in 6 weeks and x-ray lumbar spine to  obtain today for post-operative evaluation. Re-evaluation for return to work will likely be on 7/17/24 but can be discussed further at his appointment with Dr. Starkey.  I have encouraged him to contact the clinic with any questions, concerns, or adverse clinical changes. He verbalized understanding.      SHAD Hull-CELESTINO  Neurosurgery  [facility].      Note dictated with voice recognition software, please excuse any grammatical errors.

## 2024-05-22 ENCOUNTER — TELEPHONE (OUTPATIENT)
Dept: NEUROSURGERY | Facility: CLINIC | Age: 54
End: 2024-05-22
Payer: OTHER GOVERNMENT

## 2024-05-22 NOTE — TELEPHONE ENCOUNTER
----- Message from Elyssa Ortega NP sent at 5/22/2024 10:43 AM CDT -----  Regarding: FW: [facility] Prior Authorization  Contact: [facility] [phone] [phone]    ----- Message -----  From: Rhoda Hooker  Sent: 5/22/2024  10:36 AM CDT  To: Shannon BARNARD Staff  Subject: [facility] Prior Authorization                           Caller,  Asmita                  with              [facility] is calling to speak with someone in the office to request / follow up on a prior auth from [facility] for initial consult and PT visits. Caller states that they have not heard from the office and would like to speak with someone today to avoid cancellation of tomorrow's appointment. Please call to advise. Thanks.    Additional Info:    Fax [phone]

## 2024-05-22 NOTE — TELEPHONE ENCOUNTER
----- Message from Elyssa Ortega NP sent at 5/21/2024  4:05 PM CDT -----  Regarding: FW: pt advice  Contact: Sarah [phone]  I gave him the order yesterday I thought?  ----- Message -----  From: Elva Sagastume  Sent: 5/21/2024   3:45 PM CDT  To: Shannon BARNARD Staff  Subject: pt advice                                        Sarah doshi [facility] calling to requesting auth for PT. Pls call

## 2024-05-27 ENCOUNTER — PATIENT MESSAGE (OUTPATIENT)
Dept: NEUROSURGERY | Facility: CLINIC | Age: 54
End: 2024-05-27
Payer: OTHER GOVERNMENT

## 2024-06-05 ENCOUNTER — PATIENT MESSAGE (OUTPATIENT)
Dept: NEUROSURGERY | Facility: CLINIC | Age: 54
End: 2024-06-05
Payer: OTHER GOVERNMENT

## 2024-06-11 ENCOUNTER — TELEPHONE (OUTPATIENT)
Dept: NEUROSURGERY | Facility: CLINIC | Age: 54
End: 2024-06-11
Payer: OTHER GOVERNMENT

## 2024-06-11 NOTE — TELEPHONE ENCOUNTER
----- Message from Shahrzad Rdz MA sent at 6/11/2024 12:08 PM CDT -----  Regarding: FW: call back  Contact: [phone]    ----- Message -----  From: Elyssa Ortega NP  Sent: 6/11/2024  11:47 AM CDT  To: Shahrzad Rdz MA  Subject: FW: call back                                      ----- Message -----  From: Myesha Kumar  Sent: 6/11/2024  11:45 AM CDT  To: Shannon BARNARD Staff  Subject: call back                                        Caller from [facility] calling in requesting chart notes and medical records  for pt please call to discuss Further  fax number [phone]

## 2024-07-15 ENCOUNTER — OFFICE VISIT (OUTPATIENT)
Dept: NEUROSURGERY | Facility: CLINIC | Age: 54
End: 2024-07-15
Payer: OTHER GOVERNMENT

## 2024-07-15 ENCOUNTER — HOSPITAL ENCOUNTER (OUTPATIENT)
Dept: RADIOLOGY | Facility: HOSPITAL | Age: 54
Discharge: HOME | End: 2024-07-15
Attending: NURSE PRACTITIONER
Payer: OTHER GOVERNMENT

## 2024-07-15 DIAGNOSIS — M48.07 SPINAL STENOSIS, LUMBOSACRAL REGION: ICD-10-CM

## 2024-07-15 DIAGNOSIS — Z98.1 S/P SPINAL FUSION: Primary | ICD-10-CM

## 2024-07-15 DIAGNOSIS — Z98.1 S/P SPINAL FUSION: ICD-10-CM

## 2024-07-15 PROCEDURE — 99999 PR PBB SHADOW E&M-EST. PATIENT-LVL III: CPT | Mod: PBBFAC,,, | Performed by: STUDENT IN AN ORGANIZED HEALTH CARE EDUCATION/TRAINING PROGRAM

## 2024-07-15 PROCEDURE — 72131 CT LUMBAR SPINE W/O DYE: CPT | Mod: 26,,, | Performed by: RADIOLOGY

## 2024-07-15 PROCEDURE — 72100 X-RAY EXAM L-S SPINE 2/3 VWS: CPT | Mod: TC

## 2024-07-15 PROCEDURE — 99213 OFFICE O/P EST LOW 20 MIN: CPT | Mod: PBBFAC,25 | Performed by: STUDENT IN AN ORGANIZED HEALTH CARE EDUCATION/TRAINING PROGRAM

## 2024-07-15 PROCEDURE — 72131 CT LUMBAR SPINE W/O DYE: CPT | Mod: TC

## 2024-07-15 PROCEDURE — 99214 OFFICE O/P EST MOD 30 MIN: CPT | Mod: S$PBB,,, | Performed by: STUDENT IN AN ORGANIZED HEALTH CARE EDUCATION/TRAINING PROGRAM

## 2024-07-15 PROCEDURE — 72100 X-RAY EXAM L-S SPINE 2/3 VWS: CPT | Mod: 26,,, | Performed by: RADIOLOGY

## 2024-07-15 NOTE — PROGRESS NOTES
Neurosurgery  Established Patient    SUBJECTIVE:     Interval History (7/15/24):  Patient presents to clinic today for routine three-month follow-up.  He reports overall that he feels he is progressing well since his last visit.  At the last clinic appointment he rated his pain as a 7/10, while today he rates his back pain as a 5/10.  He does still have difficulty when shifting around a see or when getting out of bed, however he does think this is somewhat improved from prior.  He denies any radiating pain into his lower extremities, bowel or bladder dysfunction, new onset weakness or altered sensorium.  He is scheduled to begin physical therapy August 1st.  He says that he has hydrocodone prescribed from his PCP, however he has not been taking it.  He completed CT of his lumbar spine and x-ray of his lumbar spine today in anticipation of this clinic visit.    History of Present Illness: Dexter Ward is a 53 y.o. male s/p Posterior spinal revision/fusion L3-5 with Dr. Starkey on 4/5/24. He is being seen in clinic today for his 6 week post-op evaluation. States that he continues to struggle with back pain and leg weakness but is walking daily. Reports compliance with the brace. Rate his pain as a 7/10. States that the pain feels like nerve pain as he notices it when the lyrica is wearing off. Denies new neurological deficits. He continues to obtain pain medication from his pain provider.     Review of patient's allergies indicates:   Allergen Reactions    Prazosin Hives, Rash and Shortness Of Breath    Metformin Diarrhea    Opioids - morphine analogues     Morphine Hallucinations, Itching and Nausea And Vomiting       Current Outpatient Medications   Medication Sig Dispense Refill    acetaminophen (TYLENOL) 500 MG tablet Take 2 tablets (1,000 mg total) by mouth every 8 (eight) hours as needed for Pain. 30 tablet 0    atorvastatin (LIPITOR) 10 MG tablet TAKE ONE-HALF TABLET BY MOUTH EVERY DAY FOR CHOLESTEROL       cholecalciferol, vitamin D3, (VITAMIN D3) 50 mcg (2,000 unit) Tab TAKE ONE TABLET BY MOUTH DAILY AS A VITAMIN D3 SUPPLEMENT      diclofenac sodium (VOLTAREN) 1 % Gel Apply topically.      levothyroxine (SYNTHROID) 100 MCG tablet 100 mcg before breakfast.      LIDOcaine (LIDODERM) 5 % 2 patches as needed.      methylphenidate HCl (RITALIN) 20 MG tablet Take 20 mg by mouth 2 (two) times daily.      naloxone (NARCAN) 4 mg/actuation Spry SMARTSIG:Both Nares      oxyCODONE (ROXICODONE) 10 mg Tab immediate release tablet Take 1 tablet (10 mg total) by mouth every 6 (six) hours as needed for Pain. 30 tablet 0    semaglutide (OZEMPIC) 2 mg/dose (8 mg/3 mL) PnIj Sundays      pregabalin (LYRICA) 100 MG capsule Take 1 capsule (100 mg total) by mouth 2 (two) times daily. 60 capsule 1     No current facility-administered medications for this visit.       Past Medical History:   Diagnosis Date    Depression     Diabetes mellitus, type 2     Hyperlipidemia     Hypothyroidism     Pulmonary embolism      Past Surgical History:   Procedure Laterality Date    ANKLE SURGERY Bilateral     x 2- right;; 9 to left    BACK SURGERY      multiple    KNEE ARTHROSCOPY Left     ROBOTIC FUSION,SPINE,LUMBAR,TLIF N/A 4/5/2024    Procedure: ROBOTIC REVISION L3-S1 FUSION;  Surgeon: Gilson Starkey DO;  Location: [facility] OR [address] [place] [address];  Service: Neurosurgery;  Laterality: N/A;  [place] I  ASA I BLOOD TYPE & SCREEN BRACE LSO RADIOLOGY C-ARM NEURO MON: EMG SEP POSITION PRONE BED TANVI 4 POST HEADREST PRONE VIEW MISC. EQUIP: [facility] SPECIALTY EQUIPTMENT GLOBUS     Family History       Problem Relation (Age of Onset)    No Known Problems Mother, Father          Social History     Socioeconomic History    Marital status:    Tobacco Use    Smoking status: Never    Smokeless tobacco: Never   Substance and Sexual Activity    Alcohol use: Not Currently     Social Determinants of Health     Financial Resource Strain: Low Risk  (4/9/2024)    Overall  Financial Resource Strain (CARDIA)     Difficulty of Paying Living Expenses: Not hard at all   Food Insecurity: No Food Insecurity (4/9/2024)    Hunger Vital Sign     Worried About Running Out of Food in the Last Year: Never true     Ran Out of Food in the Last Year: Never true   Transportation Needs: No Transportation Needs (4/9/2024)    PRAPARE - Transportation     Lack of Transportation (Medical): No     Lack of Transportation (Non-Medical): No   Physical Activity: Sufficiently Active (4/9/2024)    Exercise Vital Sign     Days of Exercise per Week: 5 days     Minutes of Exercise per Session: 60 min   Recent Concern: Physical Activity - Insufficiently Active (4/6/2024)    Exercise Vital Sign     Days of Exercise per Week: 2 days     Minutes of Exercise per Session: 60 min   Stress: Stress Concern Present (4/9/2024)    [employer] - Occupational Stress Questionnaire     Feeling of Stress : Rather much   Housing Stability: Low Risk  (4/9/2024)    Housing Stability Vital Sign     Unable to Pay for Housing in the Last Year: No     Number of Places Lived in the Last Year: 1     Unstable Housing in the Last Year: No       Review of Systems    OBJECTIVE:     Vital Signs  Pain Score:   5  There is no height or weight on file to calculate BMI.    Neurosurgery Physical Exam  General: well developed, well nourished, no distress.   Head: normocephalic, atraumatic  Neurologic: Alert and oriented. Thought content appropriate.  GCS: Motor: 6/Verbal: 5/Eyes: 4 GCS Total: 15  Mental Status: Awake, Alert, Oriented x 4  Language: No aphasia  Speech: No dysarthria  Cranial nerves: face symmetric, tongue midline, CN II-XII grossly intact.   Eyes: pupils equal, round, reactive to light with accomodation, EOMI.   Pulmonary: normal respirations, no signs of respiratory distress  Abdomen: soft, non-distended  Skin: Skin is warm, dry and intact. Incision well-healed  Sensory: intact to light touch  throughout  Motor Strength:Moves all extremities spontaneously with good tone.       Diagnostic Results:  CT lumbar spine 07/15/2024:  There is demonstration of the L3-S1 revision fusion construct.  There is an L3-4 TLIF cage in place with evolving fusion mass across the disc space.  There is increased disc space height at this level.  There is evolving fusion mass across the L3-L5 posterolateral aspect with some mild haloing around the L4 screws bilaterally.  There are orphaned S1 screws bilaterally.  There is no evidence for new hardware malfunction or malposition.    ASSESSMENT/PLAN:     Dexter Ward is a 53 y.o. male s/p Posterior spinal revision/fusion L3-5 with Dr. Starkey on 4/5/24.  She is progressing well.  His pain is improving, albeit slowly.  He is counseled that he should take his hydrocodone which is prescribed from his PCP at the onset of pain as opposed to trying to wait it out.  He will start physical therapy on August 1st, and he was counseled that he should anticipate his pain level to increase during this time.  We will plan to see the patient back in 6 months with x-rays of his lumbar spine with flexion-extension.  The patient was counseled on the plan of care and voices understanding.  All questions were answered.

## 2024-07-19 ENCOUNTER — TELEPHONE (OUTPATIENT)
Dept: NEUROSURGERY | Facility: CLINIC | Age: 54
End: 2024-07-19
Payer: OTHER GOVERNMENT

## 2024-07-19 NOTE — TELEPHONE ENCOUNTER
----- Message from Sue Pruett sent at 7/17/2024  1:23 PM CDT -----  Regarding: appt  Name of Who is Calling:[facility]         What is the request in detail: Requesting patient last visit from May 20 2024   Please Advise              Can the clinic reply by [facility]: no         What Number to Call Back if not [phone] ext [phone]

## 2024-09-05 ENCOUNTER — PATIENT MESSAGE (OUTPATIENT)
Dept: NEUROSURGERY | Facility: CLINIC | Age: 54
End: 2024-09-05
Payer: OTHER GOVERNMENT

## 2024-09-11 ENCOUNTER — PATIENT MESSAGE (OUTPATIENT)
Dept: NEUROSURGERY | Facility: CLINIC | Age: 54
End: 2024-09-11
Payer: OTHER GOVERNMENT

## 2024-11-13 ENCOUNTER — PATIENT MESSAGE (OUTPATIENT)
Dept: RESEARCH | Facility: HOSPITAL | Age: 54
End: 2024-11-13
Payer: OTHER GOVERNMENT

## 2025-01-15 ENCOUNTER — HOSPITAL ENCOUNTER (OUTPATIENT)
Dept: RADIOLOGY | Facility: HOSPITAL | Age: 55
Discharge: HOME | End: 2025-01-15
Attending: STUDENT IN AN ORGANIZED HEALTH CARE EDUCATION/TRAINING PROGRAM
Payer: OTHER GOVERNMENT

## 2025-01-15 DIAGNOSIS — Z98.1 S/P SPINAL FUSION: ICD-10-CM

## 2025-01-15 PROCEDURE — 72114 X-RAY EXAM L-S SPINE BENDING: CPT | Mod: TC,PN

## 2025-01-15 PROCEDURE — 72114 X-RAY EXAM L-S SPINE BENDING: CPT | Mod: 26,,, | Performed by: RADIOLOGY

## (undated) DEVICE — TRAY CATH FOL SIL URIMTR 16FR

## (undated) DEVICE — SUT CTD VICRYL 2-0 CR/CT-2

## (undated) DEVICE — KIT SURGIFLO HEMOSTATIC MATRIX

## (undated) DEVICE — HEMOSTAT SURGICEL 4X8IN

## (undated) DEVICE — DRESSING MEPILEX BORDER 4 X 4

## (undated) DEVICE — DRAPE C-ARMOR EQUIPMENT COVER

## (undated) DEVICE — SUT MONOCRYL 4.0 PS2 CP496G

## (undated) DEVICE — DRAPE STERI-DRAPE 1000 17X11IN

## (undated) DEVICE — DRAPE ABDOMINAL TIBURON 14X11

## (undated) DEVICE — FRAZIER 18FR

## (undated) DEVICE — SYS CLSR DERMABOND PRINEO 22CM

## (undated) DEVICE — DRESSING TRANS 4X4 TEGADERM

## (undated) DEVICE — MARKER SKIN RULER STERILE

## (undated) DEVICE — DRAPE C-ARM ELAS CLIP 42X120IN

## (undated) DEVICE — STAPLER SKIN PROXIMATE WIDE

## (undated) DEVICE — SEALER AQUAMANTYS 2.3 BIPOLAR

## (undated) DEVICE — SUT VICRYL 2 0 CT 2

## (undated) DEVICE — ELECTRODE REM PLYHSV RETURN 9

## (undated) DEVICE — DRESSING AQUACEL FOAM 5 X 5

## (undated) DEVICE — Device

## (undated) DEVICE — ROUTER TAPERED 2.3MM

## (undated) DEVICE — DRESSING AQUACEL FOAM 4 X 12

## (undated) DEVICE — TUBE FRAZIER 5MM 2FT SOFT TIP

## (undated) DEVICE — DRESSING SURGICAL 1/2X1/2

## (undated) DEVICE — BLADE 4IN EDGE INSULATED

## (undated) DEVICE — SPONGE GAUZE 16PLY 4X4

## (undated) DEVICE — SPHERE NDI PASSIVE

## (undated) DEVICE — COVER BACK TBL HD 2-TIER 72IN

## (undated) DEVICE — BLADE MILL+ BONE MEDIUM DISP

## (undated) DEVICE — DRAPE INCISE IOBAN 2 23X17IN

## (undated) DEVICE — DRAPE TOP 53X102IN

## (undated) DEVICE — CORD BIPOLAR 12 FOOT

## (undated) DEVICE — SUT MCRYL PLUS 4-0 PS2 27IN

## (undated) DEVICE — GUIDE POST LP QUATTRO SHORT

## (undated) DEVICE — DRILL BIT SURG GPS HI SPD 4MM

## (undated) DEVICE — SPIKE LOW PROFILE QUATTRO SH

## (undated) DEVICE — DRESSING AQUACEL FOAM 3 X 3

## (undated) DEVICE — DRAPE THREE-QTR REINF 53X77IN

## (undated) DEVICE — SUT STRATAFIX PDS PLUS VIO

## (undated) DEVICE — NDL SPINAL 18GX3.5 SPINOCAN

## (undated) DEVICE — EXCELSIUS GPS, MONITOR DRAPE

## (undated) DEVICE — CAUTERY BOVIE PENCIL

## (undated) DEVICE — SYR IRRIGATION BULB STER 60ML

## (undated) DEVICE — SUT VICRYL+ 1 CT1 18IN

## (undated) DEVICE — SPONGE LAP 4X18 PREWASHED

## (undated) DEVICE — BIT DRILL REAM GPS 3.5MM

## (undated) DEVICE — DRESSING AQUACEL SACRAL 9 X 9

## (undated) DEVICE — BURR ROUND PRECISION 7.5MM

## (undated) DEVICE — KIT SPINAL PATIENT CARE JACK

## (undated) DEVICE — DRESSING ABSRBNT ISLAND 3.6X8

## (undated) DEVICE — SPONGE COTTON TRAY 4X4IN

## (undated) DEVICE — DRAPE STERI INSTRUMENT 1018

## (undated) DEVICE — TRAY NEURO OMC

## (undated) DEVICE — BUR BONE CUT MICRO TPS 3X3.8MM

## (undated) DEVICE — KIT EVACUATOR 3-SPRING 1/8 DRN

## (undated) DEVICE — EXCELSIUS GPS, ARM DRAPE

## (undated) DEVICE — CHLORAPREP W TINT 26ML APPL